# Patient Record
Sex: FEMALE | Race: WHITE | URBAN - METROPOLITAN AREA
[De-identification: names, ages, dates, MRNs, and addresses within clinical notes are randomized per-mention and may not be internally consistent; named-entity substitution may affect disease eponyms.]

---

## 2017-08-30 ENCOUNTER — EMERGENCY (EMERGENCY)
Facility: HOSPITAL | Age: 62
LOS: 0 days | Discharge: HOME | End: 2017-08-30

## 2017-08-30 DIAGNOSIS — Z88.0 ALLERGY STATUS TO PENICILLIN: ICD-10-CM

## 2017-08-30 DIAGNOSIS — I10 ESSENTIAL (PRIMARY) HYPERTENSION: ICD-10-CM

## 2017-08-30 DIAGNOSIS — R63.0 ANOREXIA: ICD-10-CM

## 2017-08-30 DIAGNOSIS — R10.31 RIGHT LOWER QUADRANT PAIN: ICD-10-CM

## 2017-08-30 DIAGNOSIS — Z98.891 HISTORY OF UTERINE SCAR FROM PREVIOUS SURGERY: ICD-10-CM

## 2017-08-30 DIAGNOSIS — F17.200 NICOTINE DEPENDENCE, UNSPECIFIED, UNCOMPLICATED: ICD-10-CM

## 2017-08-30 DIAGNOSIS — Z90.710 ACQUIRED ABSENCE OF BOTH CERVIX AND UTERUS: ICD-10-CM

## 2017-08-30 DIAGNOSIS — E78.00 PURE HYPERCHOLESTEROLEMIA, UNSPECIFIED: ICD-10-CM

## 2018-10-01 ENCOUNTER — EMERGENCY (EMERGENCY)
Facility: HOSPITAL | Age: 63
LOS: 1 days | Discharge: HOME | End: 2018-10-01

## 2018-10-01 VITALS
OXYGEN SATURATION: 98 % | SYSTOLIC BLOOD PRESSURE: 189 MMHG | RESPIRATION RATE: 20 BRPM | TEMPERATURE: 98 F | DIASTOLIC BLOOD PRESSURE: 82 MMHG | HEART RATE: 93 BPM

## 2018-10-01 DIAGNOSIS — R51 HEADACHE: ICD-10-CM

## 2018-10-01 DIAGNOSIS — Z88.0 ALLERGY STATUS TO PENICILLIN: ICD-10-CM
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
## 2018-10-01 NOTE — ED ADULT TRIAGE NOTE - CHIEF COMPLAINT QUOTE
Pt sent from PMD for evaluation of headache after she had syncopal episode last week and hitting her head

## 2018-10-02 ENCOUNTER — EMERGENCY (EMERGENCY)
Facility: HOSPITAL | Age: 63
LOS: 0 days | Discharge: HOME | End: 2018-10-02
Attending: EMERGENCY MEDICINE | Admitting: EMERGENCY MEDICINE

## 2018-10-02 VITALS — SYSTOLIC BLOOD PRESSURE: 131 MMHG | DIASTOLIC BLOOD PRESSURE: 84 MMHG | HEART RATE: 71 BPM

## 2018-10-02 VITALS
OXYGEN SATURATION: 100 % | RESPIRATION RATE: 20 BRPM | HEART RATE: 94 BPM | SYSTOLIC BLOOD PRESSURE: 187 MMHG | TEMPERATURE: 98 F | DIASTOLIC BLOOD PRESSURE: 91 MMHG

## 2018-10-02 DIAGNOSIS — F17.200 NICOTINE DEPENDENCE, UNSPECIFIED, UNCOMPLICATED: ICD-10-CM

## 2018-10-02 DIAGNOSIS — I10 ESSENTIAL (PRIMARY) HYPERTENSION: ICD-10-CM

## 2018-10-02 DIAGNOSIS — R55 SYNCOPE AND COLLAPSE: ICD-10-CM

## 2018-10-02 DIAGNOSIS — Z88.0 ALLERGY STATUS TO PENICILLIN: ICD-10-CM

## 2018-10-02 DIAGNOSIS — R00.2 PALPITATIONS: ICD-10-CM

## 2018-10-02 DIAGNOSIS — R51 HEADACHE: ICD-10-CM

## 2018-10-02 LAB
ALBUMIN SERPL ELPH-MCNC: 4.9 G/DL — SIGNIFICANT CHANGE UP (ref 3.5–5.2)
ALP SERPL-CCNC: 80 U/L — SIGNIFICANT CHANGE UP (ref 30–115)
ALT FLD-CCNC: 15 U/L — SIGNIFICANT CHANGE UP (ref 0–41)
ANION GAP SERPL CALC-SCNC: 12 MMOL/L — SIGNIFICANT CHANGE UP (ref 7–14)
AST SERPL-CCNC: 15 U/L — SIGNIFICANT CHANGE UP (ref 0–41)
BILIRUB SERPL-MCNC: 0.2 MG/DL — SIGNIFICANT CHANGE UP (ref 0.2–1.2)
BUN SERPL-MCNC: 13 MG/DL — SIGNIFICANT CHANGE UP (ref 10–20)
CALCIUM SERPL-MCNC: 9.3 MG/DL — SIGNIFICANT CHANGE UP (ref 8.5–10.1)
CHLORIDE SERPL-SCNC: 104 MMOL/L — SIGNIFICANT CHANGE UP (ref 98–110)
CO2 SERPL-SCNC: 26 MMOL/L — SIGNIFICANT CHANGE UP (ref 17–32)
CREAT SERPL-MCNC: 0.8 MG/DL — SIGNIFICANT CHANGE UP (ref 0.7–1.5)
GLUCOSE SERPL-MCNC: 117 MG/DL — HIGH (ref 70–99)
HCT VFR BLD CALC: 39.2 % — SIGNIFICANT CHANGE UP (ref 37–47)
HGB BLD-MCNC: 13.2 G/DL — SIGNIFICANT CHANGE UP (ref 12–16)
MCHC RBC-ENTMCNC: 30.2 PG — SIGNIFICANT CHANGE UP (ref 27–31)
MCHC RBC-ENTMCNC: 33.7 G/DL — SIGNIFICANT CHANGE UP (ref 32–37)
MCV RBC AUTO: 89.7 FL — SIGNIFICANT CHANGE UP (ref 81–99)
NRBC # BLD: 0 /100 WBCS — SIGNIFICANT CHANGE UP (ref 0–0)
PLATELET # BLD AUTO: 196 K/UL — SIGNIFICANT CHANGE UP (ref 130–400)
POTASSIUM SERPL-MCNC: 4.6 MMOL/L — SIGNIFICANT CHANGE UP (ref 3.5–5)
POTASSIUM SERPL-SCNC: 4.6 MMOL/L — SIGNIFICANT CHANGE UP (ref 3.5–5)
PROT SERPL-MCNC: 6.9 G/DL — SIGNIFICANT CHANGE UP (ref 6–8)
RBC # BLD: 4.37 M/UL — SIGNIFICANT CHANGE UP (ref 4.2–5.4)
RBC # FLD: 12.9 % — SIGNIFICANT CHANGE UP (ref 11.5–14.5)
SODIUM SERPL-SCNC: 142 MMOL/L — SIGNIFICANT CHANGE UP (ref 135–146)
TROPONIN T SERPL-MCNC: <0.01 NG/ML — SIGNIFICANT CHANGE UP
WBC # BLD: 5.62 K/UL — SIGNIFICANT CHANGE UP (ref 4.8–10.8)
WBC # FLD AUTO: 5.62 K/UL — SIGNIFICANT CHANGE UP (ref 4.8–10.8)

## 2018-10-02 RX ORDER — SODIUM CHLORIDE 9 MG/ML
1000 INJECTION INTRAMUSCULAR; INTRAVENOUS; SUBCUTANEOUS ONCE
Qty: 0 | Refills: 0 | Status: COMPLETED | OUTPATIENT
Start: 2018-10-02 | End: 2018-10-02

## 2018-10-02 RX ORDER — TIZANIDINE 4 MG/1
1 TABLET ORAL
Qty: 28 | Refills: 0
Start: 2018-10-02 | End: 2018-10-08

## 2018-10-02 RX ORDER — METOCLOPRAMIDE HCL 10 MG
10 TABLET ORAL ONCE
Qty: 0 | Refills: 0 | Status: COMPLETED | OUTPATIENT
Start: 2018-10-02 | End: 2018-10-02

## 2018-10-02 RX ORDER — IBUPROFEN 200 MG
1 TABLET ORAL
Qty: 28 | Refills: 0
Start: 2018-10-02 | End: 2018-10-08

## 2018-10-02 RX ORDER — MORPHINE SULFATE 50 MG/1
6 CAPSULE, EXTENDED RELEASE ORAL ONCE
Qty: 0 | Refills: 0 | Status: DISCONTINUED | OUTPATIENT
Start: 2018-10-02 | End: 2018-10-02

## 2018-10-02 RX ORDER — KETOROLAC TROMETHAMINE 30 MG/ML
30 SYRINGE (ML) INJECTION ONCE
Qty: 0 | Refills: 0 | Status: DISCONTINUED | OUTPATIENT
Start: 2018-10-02 | End: 2018-10-02

## 2018-10-02 RX ADMIN — Medication 1 MILLIGRAM(S): at 09:28

## 2018-10-02 RX ADMIN — MORPHINE SULFATE 6 MILLIGRAM(S): 50 CAPSULE, EXTENDED RELEASE ORAL at 11:21

## 2018-10-02 RX ADMIN — Medication 30 MILLIGRAM(S): at 09:30

## 2018-10-02 RX ADMIN — SODIUM CHLORIDE 2000 MILLILITER(S): 9 INJECTION INTRAMUSCULAR; INTRAVENOUS; SUBCUTANEOUS at 08:11

## 2018-10-02 RX ADMIN — Medication 10 MILLIGRAM(S): at 08:11

## 2018-10-02 NOTE — ED PROVIDER NOTE - ATTENDING CONTRIBUTION TO CARE
I personally evaluated the patient. I reviewed the Resident’s or Physician Assistant’s note (as assigned above), and agree with the findings and plan except as documented in my note.    62 yo woman with sudden onset of headache a few days ago followed by syncope.  Headache has been persistent since then. No vomiting or nausea.  No exacerbating or alleviating factors.  No fever or chills.  No chest pain or SOB.  EKG normal.  CT head ok. Labs ok.  On reassessment, still with pain despite metoclopramide, ketorolac and lorazepam.  CTA ordered and morphine given with complete relief.  CTA also negative.  Reassurance and discharge home with neuro and PMD follow up.

## 2018-10-02 NOTE — ED PROVIDER NOTE - PROGRESS NOTE DETAILS
Patient states initial HA "20 out of 10," and now "8 out of 10." Will administer morphine. Also will order CTA head as HA has not improved and patient persistently hypertensive throughout course of week. Placed call to patient's PMD, has not received callback yet. Patient states that her headache has completely relieved.  No pain at this time.  CT head and cTA negative.  Stable for discharge and outpatient follow up with neurology and PMD.

## 2018-10-02 NOTE — ED ADULT NURSE NOTE - OBJECTIVE STATEMENT
Pt complaining of headache and high BP x 1 week. Pt also reports falling and hitting her head about 1 week ago also.

## 2018-10-02 NOTE — ED PROVIDER NOTE - OBJECTIVE STATEMENT
62yo female with PMHx HTN on metoprolol 100mg once daily, active smoker, presents c/o persistent, generalized, non-radiating, throbbing x 1 week, initially precipitated s/p a syncopal episode. Patient states she was outside drinking a frozen gatorade, when she experienced "brain freeze," and then syncopized, falling onto deck. She reports she may have hit the back of her head. She states following fall, she has had a daily constant HA and elevated BP. She also reports facial flushing and palpitations. She was seen yesterday by her PMD Dr. Bueno and was found to have /90 and was given samples of a new BP med to take at home. She was also directed to come to ED for head trauma/syncope workup. She states she came into triage yesterday but then left. 64yo female with PMHx HTN on metoprolol 100mg once daily, active smoker, presents c/o persistent, generalized, non-radiating, throbbing x 1 week, initially precipitated s/p a syncopal episode. Patient states she was outside drinking a frozen gatorade, when she experienced "brain freeze," and then syncopized, falling onto deck. She reports she may have hit the back of her head. She states following fall, she has had a daily constant HA and elevated BP. HA mildly relieved with Motrin or Tylenol but not completely. She also reports facial flushing and palpitations. She was seen yesterday by her PMD Dr. Bueno and was found to have /90 and was given samples of a new BP med to take at home. She was also directed to come to ED for head trauma/syncope workup. She states she came into triage yesterday but then left.

## 2018-10-02 NOTE — ED PROVIDER NOTE - MEDICAL DECISION MAKING DETAILS
Chart finished.  I personally evaluated the patient. I reviewed the Resident’s or Physician Assistant’s note (as assigned above), and agree with the findings and plan except as documented in my note.    64 yo woman with sudden onset of headache a few days ago followed by syncope.  Headache has been persistent since then. No vomiting or nausea.  No exacerbating or alleviating factors.  No fever or chills.  No chest pain or SOB.  EKG normal.  CT head ok. Labs ok.  On reassessment, still with pain despite metoclopramide, ketorolac and lorazepam.  CTA ordered and morphine given with complete relief.  CTA also negative.  Reassurance and discharge home with neuro and PMD follow up.

## 2018-10-02 NOTE — ED PROVIDER NOTE - PHYSICAL EXAMINATION
CONST: Well appearing in NAD, anxious  EYES: PERRL, EOMI, Sclera and conjunctiva clear.   ENT: No nasal discharge.   NECK: Non-tender, no meningeal signs  CARD: Normal S1 S2; Normal rate and rhythm  RESP: Equal BS B/L, No wheezes, rhonchi or rales. No distress  GI: Soft, non-tender, non-distended.  MS: Normal ROM in all extremities. No midline spinal tenderness.  SKIN: Warm, dry, no acute rashes. Good turgor  NEURO: A&Ox3, No focal deficits. Strength 5/5 with no sensory deficits. Steady gait

## 2018-10-02 NOTE — ED PROVIDER NOTE - NS ED ROS FT
CONST: No fever, chills or bodyaches  EYES: No pain, redness, drainage or visual changes.  ENT: No ear pain or discharge, nasal discharge or congestion. No sore throat  CARD: No chest pain, (+) palpitations  RESP: No SOB, cough, hemoptysis. No hx of asthma or COPD  GI: No abdominal pain, N/V/D  MS: No joint pain, back pain or extremity pain/injury  SKIN: No rashes  NEURO: see HPI

## 2018-10-03 LAB
HCV AB S/CO SERPL IA: 0.09 S/CO — SIGNIFICANT CHANGE UP
HCV AB SERPL-IMP: SIGNIFICANT CHANGE UP

## 2019-01-10 ENCOUNTER — EMERGENCY (EMERGENCY)
Facility: HOSPITAL | Age: 64
LOS: 0 days | Discharge: HOME | End: 2019-01-10
Attending: EMERGENCY MEDICINE | Admitting: EMERGENCY MEDICINE

## 2019-01-10 VITALS
TEMPERATURE: 97 F | SYSTOLIC BLOOD PRESSURE: 163 MMHG | OXYGEN SATURATION: 100 % | DIASTOLIC BLOOD PRESSURE: 70 MMHG | RESPIRATION RATE: 22 BRPM | HEART RATE: 90 BPM

## 2019-01-10 DIAGNOSIS — E78.5 HYPERLIPIDEMIA, UNSPECIFIED: ICD-10-CM

## 2019-01-10 DIAGNOSIS — Z88.0 ALLERGY STATUS TO PENICILLIN: ICD-10-CM

## 2019-01-10 DIAGNOSIS — R06.00 DYSPNEA, UNSPECIFIED: ICD-10-CM

## 2019-01-10 DIAGNOSIS — Z79.899 OTHER LONG TERM (CURRENT) DRUG THERAPY: ICD-10-CM

## 2019-01-10 DIAGNOSIS — R05 COUGH: ICD-10-CM

## 2019-01-10 DIAGNOSIS — K21.9 GASTRO-ESOPHAGEAL REFLUX DISEASE WITHOUT ESOPHAGITIS: ICD-10-CM

## 2019-01-10 DIAGNOSIS — R06.02 SHORTNESS OF BREATH: ICD-10-CM

## 2019-01-10 DIAGNOSIS — F17.200 NICOTINE DEPENDENCE, UNSPECIFIED, UNCOMPLICATED: ICD-10-CM

## 2019-01-10 DIAGNOSIS — I10 ESSENTIAL (PRIMARY) HYPERTENSION: ICD-10-CM

## 2019-01-10 LAB
ALBUMIN SERPL ELPH-MCNC: 5.2 G/DL — SIGNIFICANT CHANGE UP (ref 3.5–5.2)
ALP SERPL-CCNC: 105 U/L — SIGNIFICANT CHANGE UP (ref 30–115)
ALT FLD-CCNC: 20 U/L — SIGNIFICANT CHANGE UP (ref 0–41)
ANION GAP SERPL CALC-SCNC: 16 MMOL/L — HIGH (ref 7–14)
AST SERPL-CCNC: 18 U/L — SIGNIFICANT CHANGE UP (ref 0–41)
BASE EXCESS BLDV CALC-SCNC: 0.3 MMOL/L — SIGNIFICANT CHANGE UP (ref -2–2)
BASOPHILS # BLD AUTO: 0.02 K/UL — SIGNIFICANT CHANGE UP (ref 0–0.2)
BASOPHILS NFR BLD AUTO: 0.3 % — SIGNIFICANT CHANGE UP (ref 0–1)
BILIRUB SERPL-MCNC: 0.2 MG/DL — SIGNIFICANT CHANGE UP (ref 0.2–1.2)
BUN SERPL-MCNC: 17 MG/DL — SIGNIFICANT CHANGE UP (ref 10–20)
CA-I SERPL-SCNC: 1.29 MMOL/L — SIGNIFICANT CHANGE UP (ref 1.12–1.3)
CALCIUM SERPL-MCNC: 10.2 MG/DL — HIGH (ref 8.5–10.1)
CHLORIDE SERPL-SCNC: 98 MMOL/L — SIGNIFICANT CHANGE UP (ref 98–110)
CO2 SERPL-SCNC: 23 MMOL/L — SIGNIFICANT CHANGE UP (ref 17–32)
CREAT SERPL-MCNC: 1.2 MG/DL — SIGNIFICANT CHANGE UP (ref 0.7–1.5)
D DIMER BLD IA.RAPID-MCNC: 90 NG/ML DDU — SIGNIFICANT CHANGE UP (ref 0–230)
EOSINOPHIL # BLD AUTO: 0.06 K/UL — SIGNIFICANT CHANGE UP (ref 0–0.7)
EOSINOPHIL NFR BLD AUTO: 1 % — SIGNIFICANT CHANGE UP (ref 0–8)
GAS PNL BLDV: 139 MMOL/L — SIGNIFICANT CHANGE UP (ref 136–145)
GAS PNL BLDV: SIGNIFICANT CHANGE UP
GLUCOSE SERPL-MCNC: 113 MG/DL — HIGH (ref 70–99)
HCO3 BLDV-SCNC: 28 MMOL/L — SIGNIFICANT CHANGE UP (ref 22–29)
HCT VFR BLD CALC: 43.9 % — SIGNIFICANT CHANGE UP (ref 37–47)
HCT VFR BLDA CALC: 47.7 % — HIGH (ref 34–44)
HGB BLD CALC-MCNC: 15.6 G/DL — SIGNIFICANT CHANGE UP (ref 14–18)
HGB BLD-MCNC: 14.7 G/DL — SIGNIFICANT CHANGE UP (ref 12–16)
IMM GRANULOCYTES NFR BLD AUTO: 0.2 % — SIGNIFICANT CHANGE UP (ref 0.1–0.3)
INR BLD: 0.97 RATIO — SIGNIFICANT CHANGE UP (ref 0.65–1.3)
LACTATE BLDV-MCNC: 2.1 MMOL/L — HIGH (ref 0.5–1.6)
LYMPHOCYTES # BLD AUTO: 1.72 K/UL — SIGNIFICANT CHANGE UP (ref 1.2–3.4)
LYMPHOCYTES # BLD AUTO: 29.9 % — SIGNIFICANT CHANGE UP (ref 20.5–51.1)
MCHC RBC-ENTMCNC: 29.7 PG — SIGNIFICANT CHANGE UP (ref 27–31)
MCHC RBC-ENTMCNC: 33.5 G/DL — SIGNIFICANT CHANGE UP (ref 32–37)
MCV RBC AUTO: 88.7 FL — SIGNIFICANT CHANGE UP (ref 81–99)
MONOCYTES # BLD AUTO: 0.33 K/UL — SIGNIFICANT CHANGE UP (ref 0.1–0.6)
MONOCYTES NFR BLD AUTO: 5.7 % — SIGNIFICANT CHANGE UP (ref 1.7–9.3)
NEUTROPHILS # BLD AUTO: 3.61 K/UL — SIGNIFICANT CHANGE UP (ref 1.4–6.5)
NEUTROPHILS NFR BLD AUTO: 62.9 % — SIGNIFICANT CHANGE UP (ref 42.2–75.2)
NRBC # BLD: 0 /100 WBCS — SIGNIFICANT CHANGE UP (ref 0–0)
PCO2 BLDV: 55 MMHG — HIGH (ref 41–51)
PH BLDV: 7.31 — SIGNIFICANT CHANGE UP (ref 7.26–7.43)
PLATELET # BLD AUTO: 245 K/UL — SIGNIFICANT CHANGE UP (ref 130–400)
PO2 BLDV: 19 MMHG — LOW (ref 20–40)
POTASSIUM BLDV-SCNC: 5.4 MMOL/L — SIGNIFICANT CHANGE UP (ref 3.3–5.6)
POTASSIUM SERPL-MCNC: 5.7 MMOL/L — HIGH (ref 3.5–5)
POTASSIUM SERPL-SCNC: 5.7 MMOL/L — HIGH (ref 3.5–5)
PROT SERPL-MCNC: 7.8 G/DL — SIGNIFICANT CHANGE UP (ref 6–8)
PROTHROM AB SERPL-ACNC: 11.2 SEC — SIGNIFICANT CHANGE UP (ref 9.95–12.87)
RBC # BLD: 4.95 M/UL — SIGNIFICANT CHANGE UP (ref 4.2–5.4)
RBC # FLD: 13 % — SIGNIFICANT CHANGE UP (ref 11.5–14.5)
SAO2 % BLDV: 24 % — SIGNIFICANT CHANGE UP
SODIUM SERPL-SCNC: 137 MMOL/L — SIGNIFICANT CHANGE UP (ref 135–146)
TROPONIN T SERPL-MCNC: <0.01 NG/ML — SIGNIFICANT CHANGE UP
WBC # BLD: 5.75 K/UL — SIGNIFICANT CHANGE UP (ref 4.8–10.8)
WBC # FLD AUTO: 5.75 K/UL — SIGNIFICANT CHANGE UP (ref 4.8–10.8)

## 2019-01-10 NOTE — ED ADULT NURSE NOTE - OBJECTIVE STATEMENT
pt came to the ER today with c/o chest pressure. pt came to the ER today with c/o difficulty breathing for one month on and off.

## 2019-01-10 NOTE — ED PROVIDER NOTE - CARE PROVIDER_API CALL
Kenny Franco), Critical Care Medicine; Pulmonary Disease; Sleep Medicine  [address] [place] [address]  Phone: [phone]  Fax: [phone]

## 2019-01-10 NOTE — ED PROVIDER NOTE - NS ED ROS FT
General: No fevers,  Eyes:  No visual changes, eye pain or discharge.  ENMT:  No hearing changes, pain,   Cardiac:  +SOB, no edema.  Respiratory:  +cough, no respiratory distress.   GI:  No vomiting, no abdominal pain.  :  No dysuria  MS:  No back pain.  Neuro:  No LOC.  Skin: No skin rash.   Endocrine: No history of thyroid disease or diabetes.

## 2019-01-10 NOTE — ED PROVIDER NOTE - NSFOLLOWUPINSTRUCTIONS_ED_ALL_ED_FT
Follow up with your primary care doctor, cardiology and pulmonology  in 1-3 days     Shortness of breath    Shortness of breath (dyspnea) means you have trouble breathing and could indicate a medical problem. Causes include lung disease, heart disease, low amount of red blood cells (anemia), poor physical fitness, being overweight, smoking, etc. Your health care provider today may not be able to find a cause for your shortness of breath after your exam. In this case, it is important to have a follow-up exam with your primary care physician as instructed. If medicines were prescribed, take them as directed for the full length of time directed. Refrain from tobacco products.    SEEK IMMEDIATE MEDICAL CARE IF YOU HAVE ANY OF THE FOLLOWING SYMPTOMS: worsening shortness of breath, chest pain, back pain, abdominal pain, fever, coughing up blood, lightheadedness/dizziness.

## 2019-01-10 NOTE — ED ADULT NURSE NOTE - NSIMPLEMENTINTERV_GEN_ALL_ED
Implemented All Universal Safety Interventions:  [place] to call system. Call bell, personal items and telephone within reach. Instruct patient to call for assistance. Room bathroom lighting operational. Non-slip footwear when patient is off stretcher. Physically safe environment: no spills, clutter or unnecessary equipment. Stretcher in lowest position, wheels locked, appropriate side rails in place.

## 2019-01-10 NOTE — ED PROVIDER NOTE - OBJECTIVE STATEMENT
NOT DONE  62yo F pmhx sinus tach (dr willett [facility]), depression, anxiety, gerd, htn, hld, palpitations intermittent for "a while," saw cardiologist who dx pt with inappropriate sinus tach, for which she takes corlaner 7.5mg bid presents today for 3 days of generalized weakness and feeling winded. pt does not endorse chest pain, pt reports palpitations and sob all on exertion. pt feels asymptomatic at rest. however pt reports all symptoms have been intermittent for >1 month. pt reports increased stress at home. reports recent cough and sinusitis for which she has been taking bactrim since 1/3 and 7 day course completed. +smoker. 64yo F pmhx sinus tach (dr willett [place] [facility]), depression, anxiety, gerd, htn, hld, palpitations intermittent for "a while," saw cardiologist who dx pt with inappropriate sinus tach, for which she takes corlaner 7.5mg bid presents today for 3 days of generalized weakness and feeling winded. pt does not endorse chest pain, pt reports palpitations and sob all on exertion. pt feels asymptomatic at rest. however pt reports all symptoms have been intermittent for >1 month. pt reports increased stress at home. reports recent cough and sinusitis for which she has been taking bactrim since 1/3 and 7 day course completed. +smoker.

## 2019-01-10 NOTE — ED PROVIDER NOTE - PROGRESS NOTE DETAILS
Attending Note: 62 y/o F PMHx as noted above presents with SOB for last 2 months. Pt is under evaluation of Cardio at [facility], who started pt on several new medications without any improvement in her SX. Pt is smoker who quit yesterday. SX exacerbated with ambulation, including minimal ambulation, and associated with palpitations. PE: As documented. Plan:  Will checks labs, EKG, CXR, and reevaluate for dispo. results reviewed and dw pt. pt ambulated while on o2 sat, o2 sat remained 99% before, during, after ambulation. need for fu and return precautions discussed, pt and family member expressed understanding. Patient to be discharged from ED. Any available test results were discussed with patient and/or family. Verbal instructions given, including instructions to return to ED immediately for any new, worsening, or concerning symptoms. Patient endorsed understanding. Written discharge instructions additionally given, including follow-up plan.

## 2019-01-10 NOTE — ED PROVIDER NOTE - PHYSICAL EXAMINATION
CONSTITUTIONAL: Well-developed; well-nourished; in no acute distress, speaking in full sentences  SKIN: warm, dry  HEAD: Normocephalic; atraumatic  EYES: PERRL, EOMI, no conjunctival erythema  ENT: No nasal discharge; airway clear, mucous membranes moist  NECK: Supple; non tender, FROM  CARD: +S1, S2 no murmurs, gallops, or rubs. Regular rate and rhythm. radial 2+  RESP: No wheezes, rales or rhonchi. CTABL, no increased wob, speaking in full sentences  ABD: soft ntnd, no rebound, no guarding, no rigidity,  EXT: moves all extremities,  No clubbing, cyanosis or edema. les equal in color, temperature, size, pt 2+ b/l   NEURO: Alert, oriented, grossly unremarkable, no focal deficits, cn ii-xii grossly intact, ue and le  strength and sensation grossly intact  PSYCH: Cooperative, appropriate

## 2019-11-04 ENCOUNTER — EMERGENCY (EMERGENCY)
Facility: HOSPITAL | Age: 64
LOS: 0 days | Discharge: HOME | End: 2019-11-04
Attending: EMERGENCY MEDICINE | Admitting: EMERGENCY MEDICINE
Payer: COMMERCIAL

## 2019-11-04 VITALS
RESPIRATION RATE: 17 BRPM | OXYGEN SATURATION: 100 % | HEART RATE: 80 BPM | DIASTOLIC BLOOD PRESSURE: 66 MMHG | SYSTOLIC BLOOD PRESSURE: 131 MMHG

## 2019-11-04 VITALS
TEMPERATURE: 98 F | DIASTOLIC BLOOD PRESSURE: 86 MMHG | HEART RATE: 82 BPM | OXYGEN SATURATION: 100 % | RESPIRATION RATE: 18 BRPM | SYSTOLIC BLOOD PRESSURE: 181 MMHG

## 2019-11-04 DIAGNOSIS — R10.13 EPIGASTRIC PAIN: ICD-10-CM

## 2019-11-04 DIAGNOSIS — Z88.0 ALLERGY STATUS TO PENICILLIN: ICD-10-CM

## 2019-11-04 DIAGNOSIS — R19.7 DIARRHEA, UNSPECIFIED: ICD-10-CM

## 2019-11-04 DIAGNOSIS — R11.2 NAUSEA WITH VOMITING, UNSPECIFIED: ICD-10-CM

## 2019-11-04 DIAGNOSIS — R10.9 UNSPECIFIED ABDOMINAL PAIN: ICD-10-CM

## 2019-11-04 DIAGNOSIS — R10.30 LOWER ABDOMINAL PAIN, UNSPECIFIED: ICD-10-CM

## 2019-11-04 DIAGNOSIS — R10.11 RIGHT UPPER QUADRANT PAIN: ICD-10-CM

## 2019-11-04 LAB
ALBUMIN SERPL ELPH-MCNC: 4.9 G/DL — SIGNIFICANT CHANGE UP (ref 3.5–5.2)
ALP SERPL-CCNC: 90 U/L — SIGNIFICANT CHANGE UP (ref 30–115)
ALT FLD-CCNC: 14 U/L — SIGNIFICANT CHANGE UP (ref 0–41)
ANION GAP SERPL CALC-SCNC: 15 MMOL/L — HIGH (ref 7–14)
APPEARANCE UR: CLEAR — SIGNIFICANT CHANGE UP
AST SERPL-CCNC: 15 U/L — SIGNIFICANT CHANGE UP (ref 0–41)
BASE EXCESS BLDV CALC-SCNC: 6.9 MMOL/L — HIGH (ref -2–2)
BASOPHILS # BLD AUTO: 0.03 K/UL — SIGNIFICANT CHANGE UP (ref 0–0.2)
BASOPHILS NFR BLD AUTO: 0.4 % — SIGNIFICANT CHANGE UP (ref 0–1)
BILIRUB SERPL-MCNC: 0.3 MG/DL — SIGNIFICANT CHANGE UP (ref 0.2–1.2)
BILIRUB UR-MCNC: NEGATIVE — SIGNIFICANT CHANGE UP
BUN SERPL-MCNC: 10 MG/DL — SIGNIFICANT CHANGE UP (ref 10–20)
CA-I SERPL-SCNC: 1.22 MMOL/L — SIGNIFICANT CHANGE UP (ref 1.12–1.3)
CALCIUM SERPL-MCNC: 9.8 MG/DL — SIGNIFICANT CHANGE UP (ref 8.5–10.1)
CHLORIDE SERPL-SCNC: 102 MMOL/L — SIGNIFICANT CHANGE UP (ref 98–110)
CO2 SERPL-SCNC: 25 MMOL/L — SIGNIFICANT CHANGE UP (ref 17–32)
COLOR SPEC: SIGNIFICANT CHANGE UP
CREAT SERPL-MCNC: 0.9 MG/DL — SIGNIFICANT CHANGE UP (ref 0.7–1.5)
DIFF PNL FLD: NEGATIVE — SIGNIFICANT CHANGE UP
EOSINOPHIL # BLD AUTO: 0.07 K/UL — SIGNIFICANT CHANGE UP (ref 0–0.7)
EOSINOPHIL NFR BLD AUTO: 0.9 % — SIGNIFICANT CHANGE UP (ref 0–8)
GAS PNL BLDV: 145 MMOL/L — SIGNIFICANT CHANGE UP (ref 136–145)
GAS PNL BLDV: SIGNIFICANT CHANGE UP
GLUCOSE SERPL-MCNC: 90 MG/DL — SIGNIFICANT CHANGE UP (ref 70–99)
GLUCOSE UR QL: NEGATIVE — SIGNIFICANT CHANGE UP
HCO3 BLDV-SCNC: 32 MMOL/L — HIGH (ref 22–29)
HCT VFR BLD CALC: 39.4 % — SIGNIFICANT CHANGE UP (ref 37–47)
HCT VFR BLDA CALC: 27.4 % — LOW (ref 34–44)
HGB BLD CALC-MCNC: 8.9 G/DL — LOW (ref 14–18)
HGB BLD-MCNC: 13.3 G/DL — SIGNIFICANT CHANGE UP (ref 12–16)
IMM GRANULOCYTES NFR BLD AUTO: 0.3 % — SIGNIFICANT CHANGE UP (ref 0.1–0.3)
KETONES UR-MCNC: NEGATIVE — SIGNIFICANT CHANGE UP
LACTATE BLDV-MCNC: 1.3 MMOL/L — SIGNIFICANT CHANGE UP (ref 0.5–1.6)
LEUKOCYTE ESTERASE UR-ACNC: NEGATIVE — SIGNIFICANT CHANGE UP
LIDOCAIN IGE QN: 33 U/L — SIGNIFICANT CHANGE UP (ref 7–60)
LYMPHOCYTES # BLD AUTO: 2.52 K/UL — SIGNIFICANT CHANGE UP (ref 1.2–3.4)
LYMPHOCYTES # BLD AUTO: 34.1 % — SIGNIFICANT CHANGE UP (ref 20.5–51.1)
MAGNESIUM SERPL-MCNC: 2.2 MG/DL — SIGNIFICANT CHANGE UP (ref 1.8–2.4)
MCHC RBC-ENTMCNC: 30.3 PG — SIGNIFICANT CHANGE UP (ref 27–31)
MCHC RBC-ENTMCNC: 33.8 G/DL — SIGNIFICANT CHANGE UP (ref 32–37)
MCV RBC AUTO: 89.7 FL — SIGNIFICANT CHANGE UP (ref 81–99)
MONOCYTES # BLD AUTO: 0.43 K/UL — SIGNIFICANT CHANGE UP (ref 0.1–0.6)
MONOCYTES NFR BLD AUTO: 5.8 % — SIGNIFICANT CHANGE UP (ref 1.7–9.3)
NEUTROPHILS # BLD AUTO: 4.33 K/UL — SIGNIFICANT CHANGE UP (ref 1.4–6.5)
NEUTROPHILS NFR BLD AUTO: 58.5 % — SIGNIFICANT CHANGE UP (ref 42.2–75.2)
NITRITE UR-MCNC: NEGATIVE — SIGNIFICANT CHANGE UP
NRBC # BLD: 0 /100 WBCS — SIGNIFICANT CHANGE UP (ref 0–0)
PCO2 BLDV: 49 MMHG — SIGNIFICANT CHANGE UP (ref 41–51)
PH BLDV: 7.43 — SIGNIFICANT CHANGE UP (ref 7.26–7.43)
PH UR: 7 — SIGNIFICANT CHANGE UP (ref 5–8)
PLATELET # BLD AUTO: 200 K/UL — SIGNIFICANT CHANGE UP (ref 130–400)
PO2 BLDV: 15 MMHG — LOW (ref 20–40)
POTASSIUM BLDV-SCNC: 4.5 MMOL/L — SIGNIFICANT CHANGE UP (ref 3.3–5.6)
POTASSIUM SERPL-MCNC: 4.6 MMOL/L — SIGNIFICANT CHANGE UP (ref 3.5–5)
POTASSIUM SERPL-SCNC: 4.6 MMOL/L — SIGNIFICANT CHANGE UP (ref 3.5–5)
PROT SERPL-MCNC: 7.1 G/DL — SIGNIFICANT CHANGE UP (ref 6–8)
PROT UR-MCNC: NEGATIVE — SIGNIFICANT CHANGE UP
RBC # BLD: 4.39 M/UL — SIGNIFICANT CHANGE UP (ref 4.2–5.4)
RBC # FLD: 12.8 % — SIGNIFICANT CHANGE UP (ref 11.5–14.5)
SAO2 % BLDV: 26 % — SIGNIFICANT CHANGE UP
SODIUM SERPL-SCNC: 142 MMOL/L — SIGNIFICANT CHANGE UP (ref 135–146)
SP GR SPEC: 1.01 — SIGNIFICANT CHANGE UP (ref 1.01–1.02)
UROBILINOGEN FLD QL: SIGNIFICANT CHANGE UP
WBC # BLD: 7.4 K/UL — SIGNIFICANT CHANGE UP (ref 4.8–10.8)
WBC # FLD AUTO: 7.4 K/UL — SIGNIFICANT CHANGE UP (ref 4.8–10.8)

## 2019-11-04 PROCEDURE — 93010 ELECTROCARDIOGRAM REPORT: CPT

## 2019-11-04 PROCEDURE — 74177 CT ABD & PELVIS W/CONTRAST: CPT | Mod: 26

## 2019-11-04 PROCEDURE — 99285 EMERGENCY DEPT VISIT HI MDM: CPT

## 2019-11-04 PROCEDURE — 71045 X-RAY EXAM CHEST 1 VIEW: CPT | Mod: 26

## 2019-11-04 RX ORDER — MORPHINE SULFATE 50 MG/1
4 CAPSULE, EXTENDED RELEASE ORAL ONCE
Refills: 0 | Status: DISCONTINUED | OUTPATIENT
Start: 2019-11-04 | End: 2019-11-04

## 2019-11-04 RX ORDER — ONDANSETRON 8 MG/1
4 TABLET, FILM COATED ORAL ONCE
Refills: 0 | Status: COMPLETED | OUTPATIENT
Start: 2019-11-04 | End: 2019-11-04

## 2019-11-04 RX ORDER — FAMOTIDINE 10 MG/ML
20 INJECTION INTRAVENOUS ONCE
Refills: 0 | Status: COMPLETED | OUTPATIENT
Start: 2019-11-04 | End: 2019-11-04

## 2019-11-04 RX ORDER — SODIUM CHLORIDE 9 MG/ML
1000 INJECTION, SOLUTION INTRAVENOUS ONCE
Refills: 0 | Status: COMPLETED | OUTPATIENT
Start: 2019-11-04 | End: 2019-11-04

## 2019-11-04 RX ORDER — OXYCODONE AND ACETAMINOPHEN 5; 325 MG/1; MG/1
1 TABLET ORAL ONCE
Refills: 0 | Status: DISCONTINUED | OUTPATIENT
Start: 2019-11-04 | End: 2019-11-04

## 2019-11-04 RX ADMIN — ONDANSETRON 4 MILLIGRAM(S): 8 TABLET, FILM COATED ORAL at 15:22

## 2019-11-04 RX ADMIN — SODIUM CHLORIDE 1000 MILLILITER(S): 9 INJECTION, SOLUTION INTRAVENOUS at 15:23

## 2019-11-04 RX ADMIN — FAMOTIDINE 20 MILLIGRAM(S): 10 INJECTION INTRAVENOUS at 15:24

## 2019-11-04 RX ADMIN — MORPHINE SULFATE 4 MILLIGRAM(S): 50 CAPSULE, EXTENDED RELEASE ORAL at 15:23

## 2019-11-04 RX ADMIN — OXYCODONE AND ACETAMINOPHEN 1 TABLET(S): 5; 325 TABLET ORAL at 17:46

## 2019-11-04 NOTE — ED PROVIDER NOTE - CARE PROVIDER_API CALL
Denise Rebolledo)  Surgery  [address] [place] [address], [address] [place] [address]  Phone: [phone]  Fax: [phone]  Follow Up Time: 1-3 Days

## 2019-11-04 NOTE — ED PROVIDER NOTE - CLINICAL SUMMARY MEDICAL DECISION MAKING FREE TEXT BOX
Pt presented to ED for abd pain, chronic, with multiple episodes in past. Sx's improved but not resolved completely but family wants to go home and f/up with surgery as outpt. Results reviewed and discussed with pt and printed for patient. Anticipatory guidance given including close outpatient followup. Strict return precautions given. Pt verbalizes understanding of and agrees with plan.

## 2019-11-04 NOTE — ED PROVIDER NOTE - OBJECTIVE STATEMENT
Pt is a 65 y/o female with hx of HTN, DLD, DM, who presents to ED for abd pain for 5 days, epigastric, RUQ, and suprapubic, moderate, constant, worse since onset. + nausea, NBNB vomiting, NB diarrhea. No fever, chest pain, SOB, urinary complaints. Pt has had similar pain in the past many times, has had workups including colon-/endo-scopy, but today's pain is worse.

## 2019-11-04 NOTE — ED PROVIDER NOTE - NS ED ROS FT
Constitutional: No fever, chills.  Eyes: No visual changes.  ENT: No hearing changes. No sore throat.  Neck: No neck pain or stiffness.  Cardiovascular: No chest pain, palpitations, edema.  Pulmonary: No SOB, cough. No hemoptysis.  Abdominal: + abdominal pain, nausea, vomiting, diarrhea.  : No dysuria, frequency.  Neuro: No headache, syncope, dizziness.  MS: No back pain. No calf pain/swelling.  Psych: No suicidal ideations.

## 2019-11-04 NOTE — ED PROVIDER NOTE - PATIENT PORTAL LINK FT
You can access the FollowMyHealth Patient Portal offered by [facility] by registering at the following website: [email or web address]. By joining [employer]’s FollowMyHealth portal, you will also be able to view your health information using other applications (apps) compatible with our system.

## 2019-11-04 NOTE — ED PROVIDER NOTE - PHYSICAL EXAMINATION
Constitutional: Well developed, well nourished. NAD.  Head: Normocephalic, atraumatic.  Eyes: PERRL. EOMI.  ENT: No nasal discharge. Mucous membranes moist.  Neck: Supple. Painless ROM.  Cardiovascular: Normal S1, S2. Regular rate and rhythm. No murmurs, rubs, or gallops.  Pulmonary: Normal respiratory rate and effort. Lungs clear to auscultation bilaterally. No wheezing, rales, or rhonchi.  Abdominal: Soft. Nondistended. + epigastric, RUQ, suprapubic tenderness. No rebound, guarding, rigidity.  Extremities. Pelvis stable. No lower extremity edema, symmetric calves.  Skin: No rashes, cyanosis.  Neuro: AAOx3. No focal neurological deficits.  Psych: Normal mood. Normal affect.

## 2019-11-04 NOTE — ED ADULT NURSE NOTE - NSIMPLEMENTINTERV_GEN_ALL_ED
Implemented All Universal Safety Interventions:  [place] to call system. Call bell, personal items and telephone within reach. Instruct patient to call for assistance. Room bathroom lighting operational. Non-slip footwear when patient is off stretcher. Physically safe environment: no spills, clutter or unnecessary equipment. Stretcher in lowest position, wheels locked, appropriate side rails in place.

## 2020-01-29 ENCOUNTER — EMERGENCY (EMERGENCY)
Facility: HOSPITAL | Age: 65
LOS: 0 days | Discharge: HOME | End: 2020-01-29
Attending: EMERGENCY MEDICINE | Admitting: EMERGENCY MEDICINE
Payer: COMMERCIAL

## 2020-01-29 VITALS
OXYGEN SATURATION: 98 % | HEART RATE: 71 BPM | RESPIRATION RATE: 16 BRPM | TEMPERATURE: 98 F | DIASTOLIC BLOOD PRESSURE: 71 MMHG | SYSTOLIC BLOOD PRESSURE: 151 MMHG

## 2020-01-29 VITALS
TEMPERATURE: 98 F | HEART RATE: 65 BPM | RESPIRATION RATE: 18 BRPM | SYSTOLIC BLOOD PRESSURE: 184 MMHG | DIASTOLIC BLOOD PRESSURE: 113 MMHG | OXYGEN SATURATION: 98 %
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
## 2020-01-29 DIAGNOSIS — S62.660A NONDISPLACED FRACTURE OF DISTAL PHALANX OF RIGHT INDEX FINGER, INITIAL ENCOUNTER FOR CLOSED FRACTURE: ICD-10-CM

## 2020-01-29 DIAGNOSIS — S61.310A LACERATION WITHOUT FOREIGN BODY OF RIGHT INDEX FINGER WITH DAMAGE TO NAIL, INITIAL ENCOUNTER: ICD-10-CM
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
## 2020-01-29 DIAGNOSIS — S62.662A NONDISPLACED FRACTURE OF DISTAL PHALANX OF RIGHT MIDDLE FINGER, INITIAL ENCOUNTER FOR CLOSED FRACTURE: ICD-10-CM

## 2020-01-29 DIAGNOSIS — S61.250A OPEN BITE OF RIGHT INDEX FINGER WITHOUT DAMAGE TO NAIL, INITIAL ENCOUNTER: ICD-10-CM

## 2020-01-29 DIAGNOSIS — Z88.0 ALLERGY STATUS TO PENICILLIN: ICD-10-CM

## 2020-01-29 DIAGNOSIS — S61.312A LACERATION WITHOUT FOREIGN BODY OF RIGHT MIDDLE FINGER WITH DAMAGE TO NAIL, INITIAL ENCOUNTER: ICD-10-CM

## 2020-01-29 DIAGNOSIS — Z87.891 PERSONAL HISTORY OF NICOTINE DEPENDENCE: ICD-10-CM

## 2020-01-29 DIAGNOSIS — Z23 ENCOUNTER FOR IMMUNIZATION: ICD-10-CM
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
## 2020-01-29 LAB
ALBUMIN SERPL ELPH-MCNC: 4.6 G/DL — SIGNIFICANT CHANGE UP (ref 3.5–5.2)
ALP SERPL-CCNC: 75 U/L — SIGNIFICANT CHANGE UP (ref 30–115)
ALT FLD-CCNC: 16 U/L — SIGNIFICANT CHANGE UP (ref 0–41)
ANION GAP SERPL CALC-SCNC: 14 MMOL/L — SIGNIFICANT CHANGE UP (ref 7–14)
APTT BLD: 29.9 SEC — SIGNIFICANT CHANGE UP (ref 27–39.2)
AST SERPL-CCNC: 18 U/L — SIGNIFICANT CHANGE UP (ref 0–41)
BASOPHILS # BLD AUTO: 0.03 K/UL — SIGNIFICANT CHANGE UP (ref 0–0.2)
BASOPHILS NFR BLD AUTO: 0.6 % — SIGNIFICANT CHANGE UP (ref 0–1)
BILIRUB SERPL-MCNC: 0.3 MG/DL — SIGNIFICANT CHANGE UP (ref 0.2–1.2)
BLD GP AB SCN SERPL QL: SIGNIFICANT CHANGE UP
BUN SERPL-MCNC: 11 MG/DL — SIGNIFICANT CHANGE UP (ref 10–20)
CALCIUM SERPL-MCNC: 9 MG/DL — SIGNIFICANT CHANGE UP (ref 8.5–10.1)
CHLORIDE SERPL-SCNC: 108 MMOL/L — SIGNIFICANT CHANGE UP (ref 98–110)
CO2 SERPL-SCNC: 22 MMOL/L — SIGNIFICANT CHANGE UP (ref 17–32)
CREAT SERPL-MCNC: 0.9 MG/DL — SIGNIFICANT CHANGE UP (ref 0.7–1.5)
EOSINOPHIL # BLD AUTO: 0.1 K/UL — SIGNIFICANT CHANGE UP (ref 0–0.7)
EOSINOPHIL NFR BLD AUTO: 1.9 % — SIGNIFICANT CHANGE UP (ref 0–8)
GLUCOSE SERPL-MCNC: 158 MG/DL — HIGH (ref 70–99)
HCT VFR BLD CALC: 39.4 % — SIGNIFICANT CHANGE UP (ref 37–47)
HGB BLD-MCNC: 13.2 G/DL — SIGNIFICANT CHANGE UP (ref 12–16)
IMM GRANULOCYTES NFR BLD AUTO: 0.2 % — SIGNIFICANT CHANGE UP (ref 0.1–0.3)
INR BLD: 0.93 RATIO — SIGNIFICANT CHANGE UP (ref 0.65–1.3)
LYMPHOCYTES # BLD AUTO: 1.32 K/UL — SIGNIFICANT CHANGE UP (ref 1.2–3.4)
LYMPHOCYTES # BLD AUTO: 24.4 % — SIGNIFICANT CHANGE UP (ref 20.5–51.1)
MCHC RBC-ENTMCNC: 30.9 PG — SIGNIFICANT CHANGE UP (ref 27–31)
MCHC RBC-ENTMCNC: 33.5 G/DL — SIGNIFICANT CHANGE UP (ref 32–37)
MCV RBC AUTO: 92.3 FL — SIGNIFICANT CHANGE UP (ref 81–99)
MONOCYTES # BLD AUTO: 0.23 K/UL — SIGNIFICANT CHANGE UP (ref 0.1–0.6)
MONOCYTES NFR BLD AUTO: 4.3 % — SIGNIFICANT CHANGE UP (ref 1.7–9.3)
NEUTROPHILS # BLD AUTO: 3.71 K/UL — SIGNIFICANT CHANGE UP (ref 1.4–6.5)
NEUTROPHILS NFR BLD AUTO: 68.6 % — SIGNIFICANT CHANGE UP (ref 42.2–75.2)
NRBC # BLD: 0 /100 WBCS — SIGNIFICANT CHANGE UP (ref 0–0)
PLATELET # BLD AUTO: 178 K/UL — SIGNIFICANT CHANGE UP (ref 130–400)
POTASSIUM SERPL-MCNC: 4.3 MMOL/L — SIGNIFICANT CHANGE UP (ref 3.5–5)
POTASSIUM SERPL-SCNC: 4.3 MMOL/L — SIGNIFICANT CHANGE UP (ref 3.5–5)
PROT SERPL-MCNC: 6.8 G/DL — SIGNIFICANT CHANGE UP (ref 6–8)
PROTHROM AB SERPL-ACNC: 10.7 SEC — SIGNIFICANT CHANGE UP (ref 9.95–12.87)
RBC # BLD: 4.27 M/UL — SIGNIFICANT CHANGE UP (ref 4.2–5.4)
RBC # FLD: 13 % — SIGNIFICANT CHANGE UP (ref 11.5–14.5)
SODIUM SERPL-SCNC: 144 MMOL/L — SIGNIFICANT CHANGE UP (ref 135–146)
WBC # BLD: 5.4 K/UL — SIGNIFICANT CHANGE UP (ref 4.8–10.8)
WBC # FLD AUTO: 5.4 K/UL — SIGNIFICANT CHANGE UP (ref 4.8–10.8)

## 2020-01-29 PROCEDURE — 26750 TREAT FINGER FRACTURE EACH: CPT | Mod: 54

## 2020-01-29 PROCEDURE — 73130 X-RAY EXAM OF HAND: CPT | Mod: 26,RT

## 2020-01-29 PROCEDURE — 99284 EMERGENCY DEPT VISIT MOD MDM: CPT | Mod: 57

## 2020-01-29 RX ORDER — METRONIDAZOLE 500 MG
500 TABLET ORAL ONCE
Refills: 0 | Status: COMPLETED | OUTPATIENT
Start: 2020-01-29 | End: 2020-01-29

## 2020-01-29 RX ORDER — TETANUS TOXOID, REDUCED DIPHTHERIA TOXOID AND ACELLULAR PERTUSSIS VACCINE, ADSORBED 5; 2.5; 8; 8; 2.5 [IU]/.5ML; [IU]/.5ML; UG/.5ML; UG/.5ML; UG/.5ML
0.5 SUSPENSION INTRAMUSCULAR ONCE
Refills: 0 | Status: COMPLETED | OUTPATIENT
Start: 2020-01-29 | End: 2020-01-29

## 2020-01-29 RX ORDER — MORPHINE SULFATE 50 MG/1
8 CAPSULE, EXTENDED RELEASE ORAL ONCE
Refills: 0 | Status: DISCONTINUED | OUTPATIENT
Start: 2020-01-29 | End: 2020-01-29

## 2020-01-29 RX ORDER — METRONIDAZOLE 500 MG
1 TABLET ORAL
Qty: 21 | Refills: 0
Start: 2020-01-29 | End: 2020-02-04

## 2020-01-29 RX ORDER — MORPHINE SULFATE 50 MG/1
6 CAPSULE, EXTENDED RELEASE ORAL ONCE
Refills: 0 | Status: DISCONTINUED | OUTPATIENT
Start: 2020-01-29 | End: 2020-01-29

## 2020-01-29 RX ADMIN — Medication 100 MILLIGRAM(S): at 08:47

## 2020-01-29 RX ADMIN — Medication 110 MILLIGRAM(S): at 10:04

## 2020-01-29 RX ADMIN — MORPHINE SULFATE 6 MILLIGRAM(S): 50 CAPSULE, EXTENDED RELEASE ORAL at 10:46

## 2020-01-29 RX ADMIN — TETANUS TOXOID, REDUCED DIPHTHERIA TOXOID AND ACELLULAR PERTUSSIS VACCINE, ADSORBED 0.5 MILLILITER(S): 5; 2.5; 8; 8; 2.5 SUSPENSION INTRAMUSCULAR at 08:53

## 2020-01-29 RX ADMIN — MORPHINE SULFATE 8 MILLIGRAM(S): 50 CAPSULE, EXTENDED RELEASE ORAL at 08:48

## 2020-01-29 NOTE — ED ADULT NURSE NOTE - OBJECTIVE STATEMENT
pt presents s/p bite from pitbull to right hand 2nd and 3rd digit around 530 am this morning. pt pt with pmh HTN presents s/p bite from vaccinated pitbull to right hand 2nd and 3rd digit around 530 am this morning while trying to break up a dog fight. denies numbness/tingling. denies other injury. bleeding controlled. pulses present. denies numbness/tinglign

## 2020-01-29 NOTE — ED PROVIDER NOTE - NS ED ROS FT
Constitutional: See HPI.  Eyes: No visual changes, eye pain or discharge  ENMT: No hearing changes, pain, discharge or infections.   Cardiac: No SOB or edema. No chest pain with exertion.  Respiratory: No cough or respiratory distress.  GI: No nausea, vomiting, diarrhea or abdominal pain.  : No dysuria, frequency or burning. No Discharge  MS: + finger pain.  No myalgia, muscle weakness,  or back pain.  Neuro: No headache or weakness.   Skin: + lacerations of hand.   Except as documented in the HPI, all other systems are negative.

## 2020-01-29 NOTE — ED PROVIDER NOTE - OBJECTIVE STATEMENT
64 y.o female, hx of HTN, GERD, HLD, anxiety, presents to the ED s/p dog bite today.  Trying to get object out of her dogs mouth and sustained L lacerations R 2/3rd digit. Acute pain, sharp, worse w/ ROM, no alleviating factors, moderate severity, no radiation of pain. Dog UTD on vaccines. right hand dominant. Denies paresthesias, wrist pain.  Not utd on tdap.

## 2020-01-29 NOTE — ED PROVIDER NOTE - CLINICAL SUMMARY MEDICAL DECISION MAKING FREE TEXT BOX
Patient presented s/p dog bite to her right hand with (+) deformity. Obtained xrays which showed (+) fx of second and third fingers with (+) complex overlying lacerations. Spoke with hand who saw patient in ED - they irrigated and repaired lacerations. Recommended splinting and outpatient follow up. Patient splinted in ED after repair without difficulty. Otherwise ambulatory, tolerates PO. Will discharge home with outpatient follow up. Patient agreeable with plan. Agrees to return to ED for any new or worsening symptoms.

## 2020-01-29 NOTE — CONSULT NOTE ADULT - ASSESSMENT
---------------------------------------------------------------------------------------    ASSESSMENT:  64y Female with PMH significant for HTN, GERD, HLD, anxiety, presents to the ED s/p dog bite, lacerations to distal right 2nd and 3rd digits    PLAN:   ·	Xray hand shows distal fracture to 2nd and 3rd digits  ·	Bedside washout, repair of laceration to 2nd and 3rd digits with 5-0 prolene  ·	Patient to be discharged home with clindamycin and flagyl (PCN allergy)  ·	Will follow up with Dr. Mcqueen during office hours on Friday, Jan 31    --------------------------------------------------------------------------------------

## 2020-01-29 NOTE — CONSULT NOTE ADULT - SUBJECTIVE AND OBJECTIVE BOX
Consultation Note  =====================================================    HPI: 64y Female with PMH significant for HTN, GERD, HLD, anxiety, presents to the ED s/p dog bite, sustained injury to distal right 2nd and 3rd digits. Patient complains of excruciating pain. Dog belongs to family, up to date vaccines. Patient was trying to break up a fight between two dogs, when she reached her right hand near mouth of her family dog and was bit. Patient is right hand dominant. She is able to move all digits in her hand but states that she has excruciating pain, unable to flex digits in her right hand.     PAST MEDICAL & SURGICAL HISTORY:  Hypertension    Home Meds: Home Medications:  ALPRAZolam 0.5 mg oral tablet, disintegratin tab(s) orally 3 times a day (10 Martin 2019 11:46)  Corlanor 5 mg oral tablet: 1 tab(s) orally 2 times a day (with meals) (10 Martin 2019 11:46)  Metoprolol Succinate  mg oral tablet, extended release: 1 tab(s) orally once a day (10 Martin 2019 11:46)  olmesartan 40 mg oral tablet: 1 tab(s) orally once a day (10 Matrin 2019 11:46)  omeprazole 40 mg oral delayed release capsule: 1 cap(s) orally once a day (10 Martin 2019 11:46)  rosuvastatin 20 mg oral tablet: 1 tab(s) orally once a day (at bedtime) (10 Martin 2019 11:46)  sulfamethoxazole 500 mg oral tablet: orally 2 times a day for ten days (10 Martin 2019 11:46)  venlafaxine 75 mg oral tablet: 1 tab(s) orally 2 times a day (10 Martin 2019 11:46)    Allergies: Allergies  penicillin (Rash)    ROS:    REVIEW OF SYSTEMS    [X] A ten-point review of systems was otherwise negative except as noted.  [ ] Due to altered mental status/intubation, subjective information were not able to be obtained from the patient. History was obtained, to the extent possible, from review of the chart and collateral sources of information.    --------------------------------------------------------------------------------------  VITAL SIGNS, INS/OUTS (last 24 hours):  --------------------------------------------------------------------------------------  T(C): 36.7 (2020 08:06), Max: 36.7 (2020 08:06)  T(F): 98 (2020 08:06), Max: 98 (2020 08:06)  HR: 65 (2020 08:06) (65 - 65)  BP: 184/113 (2020 08:06) (184/113 - 184/113)  RR: 18 (2020 08:06) (18 - 18)  SpO2: 98% (2020 08:06) (98% - 98%)    --------------------------------------------------------------------------------------  PHYSICAL EXAM  General: NAD, AAOx3, calm and cooperative  HEENT: NCAT, JAIME, EOMI, Trachea ML, Neck supple  Hand: left hand no abnormalities.  Right hand 2nd and 3rd digit lacerations, brisk cap refill. 2+ radial pulses. Distal 2nd and 3rd digit lacerations and exposed bone, nailbed involvement, open fractures. Unable to range DIP joints 2/2 pain. Digits 1, 4, 5 with normal ROM, no obvious injuries.  Cardiac: RRR S1, S2, no Murmurs, rubs or gallops  Respiratory: CTAB, normal respiratory effort, breath sounds equal BL, no wheeze, rhonchi or crackles  Abdomen: Soft, non-distended, non-tender, +bowel sounds  Neuro: Sensation grossly intact and equal throughout, CN II-XII intact, no focal deficits      LABS  --------------------------------------------------------------------------------------  Labs:  CAPILLARY BLOOD GLUCOSE                              13.2   5.40  )-----------( 178      ( 2020 08:30 )             39.4       Auto Neutrophil %: 68.6 % (20 @ 08:30)  Auto Immature Granulocyte %: 0.2 % (20 @ 08:30)        144  |  108  |  11  ----------------------------<  158<H>  4.3   |  22  |  0.9      Calcium, Total Serum: 9.0 mg/dL (20 @ 08:30)      LFTs:             6.8  | 0.3  | 18       ------------------[75      ( 2020 08:30 )  4.6  | x    | 16          Coags:     10.70  ----< 0.93    ( 2020 08:30 )     29.9

## 2020-01-29 NOTE — ED PROVIDER NOTE - NSFOLLOWUPINSTRUCTIONS_ED_ALL_ED_FT
Laceration    A laceration is a cut that goes through all of the layers of the skin and into the tissue that is right under the skin. Some lacerations heal on their own. Others need to be closed with skin adhesive strips, skin glue, stitches (sutures), or staples. Proper laceration care minimizes the risk of infection and helps the laceration to heal better.  If non-absorbable stitches or staples have been placed, they must be taken out within the time frame instructed by your healthcare provider.    SEEK IMMEDIATE MEDICAL CARE IF YOU HAVE ANY OF THE FOLLOWING SYMPTOMS: swelling around the wound, worsening pain, drainage from the wound, red streaking going away from your wound, inability to move finger or toe near the laceration, or discoloration of skin near the laceration.    Fracture    A fracture is a break in one of your bones. This can occur from a variety of injuries, especially traumatic ones. Symptoms include pain, bruising, or swelling. Do not use the injured limb. If a fracture is in one of the bones below your waist, do not put weight on that limb unless instructed to do so by your healthcare provider. Crutches or a cane may have been provided. A splint or cast may have been applied by your health care provider. Make sure to keep it dry and follow up with an orthopedist as instructed.    SEEK IMMEDIATE MEDICAL CARE IF YOU HAVE ANY OF THE FOLLOWING SYMPTOMS: numbness, tingling, increasing pain, or weakness in any part of the injured limb.    Follow up with your primary medical doctor in 1-2 days    Follow up with hand surgery on Friday.

## 2020-01-29 NOTE — ED PROVIDER NOTE - PATIENT PORTAL LINK FT
You can access the FollowMyHealth Patient Portal offered by [facility] by registering at the following website: [email or web address]. By joining [employer]’s FollowMyHealth portal, you will also be able to view your health information using other applications (apps) compatible with our system.

## 2020-01-29 NOTE — ED PROVIDER NOTE - PROGRESS NOTE DETAILS
s/o to Dr. Angulo. Pending hand eval. Fractures on xray noted surgery repaired wound, f/u with [facility] on Friday.  abx ordered. Discussed results with pt.  All questions were answered and return precautions discussed.  Pt is asx and comfortable at this time.  Unremarkable re-exam.  No further concerns at this time from pt.  Will follow up with PMD.  Pt understands and agrees with tx plan. Discussed results with pt.  All questions were answered and return precautions discussed.  Pt is asx and comfortable at this time.  Unremarkable re-exam.  No further concerns at this time from pt.  Will follow up with PMD and surgery.  Pt understands and agrees with tx plan.

## 2020-01-29 NOTE — ED PROVIDER NOTE - CARE PLAN
Principal Discharge DX:	Laceration of finger  Secondary Diagnosis:	Animal bite  Secondary Diagnosis:	Fracture of finger  Secondary Diagnosis:	Need for tetanus booster

## 2020-01-29 NOTE — ED ADULT TRIAGE NOTE - CHIEF COMPLAINT QUOTE
pt BIBA with her vaccinated dog bite to R hand second and third finger, bleeding controlled. dressing removed and evaluated by Toño BEAL.
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
## 2020-01-29 NOTE — ED PROVIDER NOTE - CARE PROVIDER_API CALL
Chase Mcqueen)  Plastic Surgery; Surgery of the Hand  [address] [place] [address], [address]  [place] [address]  Phone: [phone]  Fax: [phone]  Follow Up Time: 1-3 Days

## 2020-01-29 NOTE — ED PROVIDER NOTE - PHYSICAL EXAMINATION
CONST: Well appearing in NAD  EYES: Sclera and conjunctiva clear.  CARD: Normal S1 S2; Normal rate and rhythm  RESP: Equal BS B/L, No wheezes, rhonchi or rales. No distress  GI: Soft, non-tender, non-distended.  MS: Normal ROM in all extremities, open fx 3rd digit w/ 3 cm laceration involving nail bed dorsal aspect of finger extending to palmar aspect, 2 cm laceration dorsal aspect 2nd digit.  , mild active bleeding, FROM of hand without difficulty, distal sensation intact, cap refill < 2 seconds No edema of lower extremities, no calf pain, radial pulses 2+ bilaterally  SKIN: see MS exam  NEURO: A&Ox3, No focal deficits. Strength 5/5 with no sensory deficits. Steady gait

## 2020-01-30 ENCOUNTER — APPOINTMENT (OUTPATIENT)
Dept: PLASTIC SURGERY | Facility: CLINIC | Age: 65
End: 2020-01-30
Payer: COMMERCIAL

## 2020-01-30 VITALS — WEIGHT: 138 LBS | HEIGHT: 64 IN | BODY MASS INDEX: 23.56 KG/M2

## 2020-01-30 DIAGNOSIS — I10 ESSENTIAL (PRIMARY) HYPERTENSION: ICD-10-CM

## 2020-01-30 DIAGNOSIS — F17.200 NICOTINE DEPENDENCE, UNSPECIFIED, UNCOMPLICATED: ICD-10-CM

## 2020-01-30 DIAGNOSIS — E78.00 PURE HYPERCHOLESTEROLEMIA, UNSPECIFIED: ICD-10-CM

## 2020-01-30 PROBLEM — Z00.00 ENCOUNTER FOR PREVENTIVE HEALTH EXAMINATION: Status: ACTIVE | Noted: 2020-01-30

## 2020-01-30 PROCEDURE — 99203 OFFICE O/P NEW LOW 30 MIN: CPT

## 2020-01-30 RX ORDER — METOPROLOL SUCCINATE 100 MG/1
100 TABLET, EXTENDED RELEASE ORAL
Refills: 0 | Status: ACTIVE | COMMUNITY

## 2020-01-30 RX ORDER — VENLAFAXINE HYDROCHLORIDE 75 MG/1
75 CAPSULE, EXTENDED RELEASE ORAL
Refills: 0 | Status: ACTIVE | COMMUNITY

## 2020-01-30 RX ORDER — METRONIDAZOLE 375 MG/1
375 CAPSULE ORAL
Refills: 0 | Status: ACTIVE | COMMUNITY

## 2020-01-30 RX ORDER — DOXYCYCLINE HYCLATE 100 MG/1
100 CAPSULE ORAL
Refills: 0 | Status: ACTIVE | COMMUNITY

## 2020-01-30 RX ORDER — TRAMADOL HYDROCHLORIDE 50 MG/1
50 TABLET, COATED ORAL
Qty: 10 | Refills: 0 | Status: ACTIVE | COMMUNITY
Start: 2020-01-30 | End: 1900-01-01

## 2020-01-30 RX ORDER — ALPRAZOLAM 0.5 MG/1
0.5 TABLET ORAL
Refills: 0 | Status: ACTIVE | COMMUNITY

## 2020-01-30 RX ORDER — ROSUVASTATIN CALCIUM 10 MG/1
10 TABLET, FILM COATED ORAL
Refills: 0 | Status: ACTIVE | COMMUNITY

## 2020-01-30 NOTE — PHYSICAL EXAM
[NI] : Normal [de-identified] : right hand    distal phalynx lacerations sutured, involving nail bed in ER yesterday by surg residents / tissues well approximated
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
## 2020-01-30 NOTE — ASSESSMENT
[FreeTextEntry1] : , five dogs\par pt's daughter her eight children living with her as well\par \par dog bit to right 2+3 fingers

## 2020-01-30 NOTE — DATA REVIEWED
[FreeTextEntry1] : right 2+3rd finger distal phalynx fxs seen on xray\par \par I refviewed case w residents while pt was in hospital and I was in [place]\par Discussed management\par \par Plan:  dressing and splints applied today--leave on until she returns this Monday\par At this point then will do daily dressing changes\par \par Discussed wound healing risks and risks of OM--risk factors include mechanism and 1ppd smoking history\par \par Seen/examined w pt's \par Area anesthetized w 1% lido prior to changing dressing\par \par All ?s asnwered\par \par They are aware of possible future surgery, need for hospitilization or other unplanned emergenet txs\par

## 2020-02-03 ENCOUNTER — APPOINTMENT (OUTPATIENT)
Dept: PLASTIC SURGERY | Facility: CLINIC | Age: 65
End: 2020-02-03
Payer: COMMERCIAL

## 2020-02-03 PROCEDURE — 99212 OFFICE O/P EST SF 10 MIN: CPT
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
## 2020-02-03 NOTE — ASSESSMENT
[FreeTextEntry1] : 65 y/o F with right 2nd and 3rd digit open distal phalanx fracture 1/20/20, s/p repair in ED\par -Bandages changed, splints reapplied\par - taught how to do daily dressing changes\par -Hand rest/elevation\par -Continue PO abx to completion\par -Smoking cessation\par -Tylenol PRN\par -F/u Friday as scheduled

## 2020-02-03 NOTE — PHYSICAL EXAM
[NI] : Normal [de-identified] : right hand with dorsal distal phalanx lacerations, well-approximated, sutures c/d/i, index nail plate sutured into place, +swelling, tenderness, and diminished ROM as expected, normal sensory exam, <2 sec cap refill

## 2020-02-03 NOTE — DATA REVIEWED
[FreeTextEntry1] :  EXAM:  XR HAND MIN 3 VIEWS RT      \par \par \par PROCEDURE DATE:  01/29/2020  \par \par \par \par \par INTERPRETATION:  Clinical History / Reason for exam: Trauma.\par \par Comparison: None.\par \par Procedure: Three views of the right hand.\par \par Findings/\par impression:\par \par Acute complex fractures distal second and third mary kate. Associated soft tissue injury.\par \par \par \par \par \par \par \par \par ELLIOT LANDAU M.D., ATTENDING RADIOLOGIST\par This document has been electronically signed. Jan 29 2020  2:14PM\par   \par \par   \par \par \par [id number]^RI^DC\par They are aware of possible future surgery, need for hospitilization or other unplanned emergenet txs\par

## 2020-02-07 ENCOUNTER — APPOINTMENT (OUTPATIENT)
Dept: PLASTIC SURGERY | Facility: CLINIC | Age: 65
End: 2020-02-07
Payer: COMMERCIAL

## 2020-02-07 PROCEDURE — 99212 OFFICE O/P EST SF 10 MIN: CPT

## 2020-02-07 RX ORDER — BISMUTH TRIBROMOPH/PETROLATUM 5"X9"
BANDAGE TOPICAL AS DIRECTED
Qty: 1 | Refills: 0 | Status: ACTIVE | COMMUNITY
Start: 2020-02-07 | End: 1900-01-01

## 2020-02-07 RX ORDER — LEVOFLOXACIN 750 MG/1
750 TABLET, FILM COATED ORAL DAILY
Qty: 14 | Refills: 0 | Status: ACTIVE | COMMUNITY
Start: 2020-02-07 | End: 1900-01-01
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
## 2020-02-07 NOTE — ASSESSMENT
[FreeTextEntry1] : 65 y/o F with right 2nd and 3rd digit open distal phalanx fracture 1/20/20, s/p repair in ED\par -Bandages changed, splints reapplied\par -Continue daily dressing changes: bacitracin/xeroform\par -Hand rest/elevation\par -Levaquin 750mg q24h x14d sent to pharmacy\par -OT rx for thermoplastic splint\par -Smoking cessation\par -Tylenol PRN\par -F/u 2 weeks; repeat XR prior\par \par Pt seen and examined with Dr. Mcqueen

## 2020-02-07 NOTE — PHYSICAL EXAM
[NI] : Normal [de-identified] : right hand with dorsal distal phalanx lacerations, well-approximated, sutures c/d/i, index nail plate sutured into place, +swelling, tenderness, and diminished ROM as expected, normal sensory exam, <2 sec cap refill

## 2020-02-07 NOTE — DATA REVIEWED
[FreeTextEntry1] :  EXAM:  XR HAND MIN 3 VIEWS RT      \par \par \par PROCEDURE DATE:  01/29/2020  \par \par \par \par \par INTERPRETATION:  Clinical History / Reason for exam: Trauma.\par \par Comparison: None.\par \par Procedure: Three views of the right hand.\par \par Findings/\par impression:\par \par Acute complex fractures distal second and third mary kate. Associated soft tissue injury.\par \par \par \par \par \par \par \par \par ELLIOT LANDAU M.D., ATTENDING RADIOLOGIST\par This document has been electronically signed. Jan 29 2020  2:14PM\par   \par \par   \par \par \par [id number]^RI^DC\par They are aware of possible future surgery, need for hospitilization or other unplanned emergenet txs\par

## 2020-02-13 NOTE — ED ADULT NURSE NOTE - NS ED NURSE DC INFO COMPLEXITY
--------------  DISCHARGE REVIEW    Payor: Karen BERMAN/DENICE  Subscriber #:  [id number]  Authorization Number: [id number]    Admit date: 2/1/20  Admit time:  2113  Discharge Date: 2/4/2020  5:56 PM     Admitting Physician: Chuck Márquez MD    Primary Car Simple: Patient demonstrates quick and easy understanding

## 2020-02-14 RX ORDER — FLUCONAZOLE 150 MG/1
150 TABLET ORAL
Qty: 1 | Refills: 0 | Status: ACTIVE | COMMUNITY
Start: 2020-02-14 | End: 1900-01-01

## 2020-02-14 RX ORDER — CIPROFLOXACIN HYDROCHLORIDE 500 MG/1
500 TABLET, FILM COATED ORAL
Qty: 14 | Refills: 0 | Status: ACTIVE | COMMUNITY
Start: 2020-02-14 | End: 1900-01-01

## 2020-02-19 ENCOUNTER — FORM ENCOUNTER (OUTPATIENT)
Age: 65
End: 2020-02-19

## 2020-02-20 ENCOUNTER — OUTPATIENT (OUTPATIENT)
Dept: OUTPATIENT SERVICES | Facility: HOSPITAL | Age: 65
LOS: 1 days | Discharge: HOME | End: 2020-02-20
Payer: COMMERCIAL

## 2020-02-20 DIAGNOSIS — S62.639D DISPLACED FRACTURE OF DISTAL PHALANX OF UNSPECIFIED FINGER, SUBSEQUENT ENCOUNTER FOR FRACTURE WITH ROUTINE HEALING: ICD-10-CM

## 2020-02-20 PROCEDURE — 73130 X-RAY EXAM OF HAND: CPT | Mod: 26,RT

## 2020-02-25 ENCOUNTER — APPOINTMENT (OUTPATIENT)
Dept: PLASTIC SURGERY | Facility: CLINIC | Age: 65
End: 2020-02-25
Payer: COMMERCIAL

## 2020-02-25 PROCEDURE — 99212 OFFICE O/P EST SF 10 MIN: CPT
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
## 2020-02-25 NOTE — ASSESSMENT
[FreeTextEntry1] : xrays reviewed--fx right 2+3rd distal phalanges in stable position\par \par had reaction to levaquin--breathing difficulty--switched to Bactrim by [facility] one week ago\par pt stopped taking all abx two days ago--making her tired\par \par in sum she had oral abx for one month\par \par we will stop the abx (for now)\par \par she did not get the blood work I had ordered\par \par suggest f/u in 2 wks for suture removal\par \par repeat xray in one month\par \par recently had the flu

## 2020-02-25 NOTE — PHYSICAL EXAM
[de-identified] : diminished energy, otherwise fine.  no fevers [de-identified] : right 2+3 finger tips w resolving swelling, no cellulitis, no drainage\par stable scatted eschar

## 2020-02-25 NOTE — DATA REVIEWED
[FreeTextEntry1] : EXAM: XR HAND MIN 3 VIEWS RT \par \par \par PROCEDURE DATE: 02/20/2020 \par \par \par \par \par INTERPRETATION: CLINICAL HISTORY / REASON FOR EXAM: Follow-up fractures. \par \par TECHNIQUE: 3 views of the right hand with AP, oblique, and lateral \par projections. \par \par COMPARISON: Radiographs of the right hand dated 1/29/2020. \par \par FINDINGS: \par \par Splinting of the second and third digits. Comminuted fracture of the third \par distal phalangeal tuft with improved alignment. No bridging bone to suggest \par healing. Transverse fracture of the second distal phalangeal tuft, \par unchanged. Fracture lines remain visible. \par \par No new acute fractures. Subchondral cysts/erosions of the scaphoid, lunate, \par and third proximal phalangeal head. \par \par \par IMPRESSION: \par \par 1. Improved alignment in the third distal phalangeal tuft fracture. \par 2. Unchanged second distal phalangeal tuft fracture. \par 3. Subchondral cysts/erosions in the scaphoid, lunate, and third proximal \par phalangeal head may be due to an underlying inflammatory arthropathy (such \par as rheumatoid arthritis). \par \par \par \par \par VICKEY SOLIS M.D., RESIDENT RADIOLOGIST \par This document has been electronically signed. \par SHER FITZGERALD M.D., ATTENDING RADIOLOGIST \par This document has been electronically signed. Feb 21 2020 7:36AM \par \par \par

## 2020-02-28 ENCOUNTER — EMERGENCY (EMERGENCY)
Facility: HOSPITAL | Age: 65
LOS: 0 days | Discharge: HOME | End: 2020-02-28
Attending: EMERGENCY MEDICINE | Admitting: EMERGENCY MEDICINE
Payer: COMMERCIAL

## 2020-02-28 VITALS
RESPIRATION RATE: 16 BRPM | OXYGEN SATURATION: 99 % | TEMPERATURE: 98 F | DIASTOLIC BLOOD PRESSURE: 85 MMHG | HEART RATE: 82 BPM | SYSTOLIC BLOOD PRESSURE: 149 MMHG

## 2020-02-28 DIAGNOSIS — R05 COUGH: ICD-10-CM

## 2020-02-28 DIAGNOSIS — R10.84 GENERALIZED ABDOMINAL PAIN: ICD-10-CM

## 2020-02-28 DIAGNOSIS — R07.9 CHEST PAIN, UNSPECIFIED: ICD-10-CM

## 2020-02-28 DIAGNOSIS — R53.1 WEAKNESS: ICD-10-CM

## 2020-02-28 DIAGNOSIS — Z87.891 PERSONAL HISTORY OF NICOTINE DEPENDENCE: ICD-10-CM

## 2020-02-28 DIAGNOSIS — R53.81 OTHER MALAISE: ICD-10-CM

## 2020-02-28 DIAGNOSIS — R53.83 OTHER FATIGUE: ICD-10-CM

## 2020-02-28 DIAGNOSIS — K21.9 GASTRO-ESOPHAGEAL REFLUX DISEASE WITHOUT ESOPHAGITIS: ICD-10-CM

## 2020-02-28 DIAGNOSIS — E78.5 HYPERLIPIDEMIA, UNSPECIFIED: ICD-10-CM

## 2020-02-28 DIAGNOSIS — I10 ESSENTIAL (PRIMARY) HYPERTENSION: ICD-10-CM

## 2020-02-28 LAB
ALBUMIN SERPL ELPH-MCNC: 4.8 G/DL — SIGNIFICANT CHANGE UP (ref 3.5–5.2)
ALP SERPL-CCNC: 83 U/L — SIGNIFICANT CHANGE UP (ref 30–115)
ALT FLD-CCNC: 29 U/L — SIGNIFICANT CHANGE UP (ref 0–41)
ANION GAP SERPL CALC-SCNC: 11 MMOL/L — SIGNIFICANT CHANGE UP (ref 7–14)
AST SERPL-CCNC: 24 U/L — SIGNIFICANT CHANGE UP (ref 0–41)
BASOPHILS # BLD AUTO: 0.03 K/UL — SIGNIFICANT CHANGE UP (ref 0–0.2)
BASOPHILS NFR BLD AUTO: 0.4 % — SIGNIFICANT CHANGE UP (ref 0–1)
BILIRUB SERPL-MCNC: <0.2 MG/DL — SIGNIFICANT CHANGE UP (ref 0.2–1.2)
BUN SERPL-MCNC: 14 MG/DL — SIGNIFICANT CHANGE UP (ref 10–20)
CALCIUM SERPL-MCNC: 9.4 MG/DL — SIGNIFICANT CHANGE UP (ref 8.5–10.1)
CHLORIDE SERPL-SCNC: 104 MMOL/L — SIGNIFICANT CHANGE UP (ref 98–110)
CO2 SERPL-SCNC: 25 MMOL/L — SIGNIFICANT CHANGE UP (ref 17–32)
CREAT SERPL-MCNC: 1 MG/DL — SIGNIFICANT CHANGE UP (ref 0.7–1.5)
EOSINOPHIL # BLD AUTO: 0.07 K/UL — SIGNIFICANT CHANGE UP (ref 0–0.7)
EOSINOPHIL NFR BLD AUTO: 0.9 % — SIGNIFICANT CHANGE UP (ref 0–8)
GLUCOSE SERPL-MCNC: 97 MG/DL — SIGNIFICANT CHANGE UP (ref 70–99)
HCT VFR BLD CALC: 39.4 % — SIGNIFICANT CHANGE UP (ref 37–47)
HGB BLD-MCNC: 13.9 G/DL — SIGNIFICANT CHANGE UP (ref 12–16)
IMM GRANULOCYTES NFR BLD AUTO: 0.1 % — SIGNIFICANT CHANGE UP (ref 0.1–0.3)
LACTATE SERPL-SCNC: 1.1 MMOL/L — SIGNIFICANT CHANGE UP (ref 0.7–2)
LYMPHOCYTES # BLD AUTO: 2.34 K/UL — SIGNIFICANT CHANGE UP (ref 1.2–3.4)
LYMPHOCYTES # BLD AUTO: 31.3 % — SIGNIFICANT CHANGE UP (ref 20.5–51.1)
MCHC RBC-ENTMCNC: 32 PG — HIGH (ref 27–31)
MCHC RBC-ENTMCNC: 35.3 G/DL — SIGNIFICANT CHANGE UP (ref 32–37)
MCV RBC AUTO: 90.8 FL — SIGNIFICANT CHANGE UP (ref 81–99)
MONOCYTES # BLD AUTO: 0.36 K/UL — SIGNIFICANT CHANGE UP (ref 0.1–0.6)
MONOCYTES NFR BLD AUTO: 4.8 % — SIGNIFICANT CHANGE UP (ref 1.7–9.3)
NEUTROPHILS # BLD AUTO: 4.66 K/UL — SIGNIFICANT CHANGE UP (ref 1.4–6.5)
NEUTROPHILS NFR BLD AUTO: 62.5 % — SIGNIFICANT CHANGE UP (ref 42.2–75.2)
NRBC # BLD: 0 /100 WBCS — SIGNIFICANT CHANGE UP (ref 0–0)
PLATELET # BLD AUTO: 199 K/UL — SIGNIFICANT CHANGE UP (ref 130–400)
POTASSIUM SERPL-MCNC: 4.6 MMOL/L — SIGNIFICANT CHANGE UP (ref 3.5–5)
POTASSIUM SERPL-SCNC: 4.6 MMOL/L — SIGNIFICANT CHANGE UP (ref 3.5–5)
PROT SERPL-MCNC: 7.2 G/DL — SIGNIFICANT CHANGE UP (ref 6–8)
RBC # BLD: 4.34 M/UL — SIGNIFICANT CHANGE UP (ref 4.2–5.4)
RBC # FLD: 12.2 % — SIGNIFICANT CHANGE UP (ref 11.5–14.5)
SODIUM SERPL-SCNC: 140 MMOL/L — SIGNIFICANT CHANGE UP (ref 135–146)
TROPONIN T SERPL-MCNC: <0.01 NG/ML — SIGNIFICANT CHANGE UP
WBC # BLD: 7.47 K/UL — SIGNIFICANT CHANGE UP (ref 4.8–10.8)
WBC # FLD AUTO: 7.47 K/UL — SIGNIFICANT CHANGE UP (ref 4.8–10.8)

## 2020-02-28 PROCEDURE — 93010 ELECTROCARDIOGRAM REPORT: CPT

## 2020-02-28 PROCEDURE — 71045 X-RAY EXAM CHEST 1 VIEW: CPT | Mod: 26

## 2020-02-28 PROCEDURE — 99285 EMERGENCY DEPT VISIT HI MDM: CPT

## 2020-02-28 RX ORDER — HYDROXYZINE HCL 10 MG
25 TABLET ORAL ONCE
Refills: 0 | Status: COMPLETED | OUTPATIENT
Start: 2020-02-28 | End: 2020-02-28

## 2020-02-28 RX ORDER — SODIUM CHLORIDE 9 MG/ML
1000 INJECTION INTRAMUSCULAR; INTRAVENOUS; SUBCUTANEOUS ONCE
Refills: 0 | Status: COMPLETED | OUTPATIENT
Start: 2020-02-28 | End: 2020-02-28

## 2020-02-28 RX ORDER — METOCLOPRAMIDE HCL 10 MG
10 TABLET ORAL ONCE
Refills: 0 | Status: COMPLETED | OUTPATIENT
Start: 2020-02-28 | End: 2020-02-28

## 2020-02-28 RX ORDER — ACETAMINOPHEN 500 MG
975 TABLET ORAL ONCE
Refills: 0 | Status: COMPLETED | OUTPATIENT
Start: 2020-02-28 | End: 2020-02-28

## 2020-02-28 RX ADMIN — SODIUM CHLORIDE 2000 MILLILITER(S): 9 INJECTION INTRAMUSCULAR; INTRAVENOUS; SUBCUTANEOUS at 12:38

## 2020-02-28 RX ADMIN — Medication 10 MILLIGRAM(S): at 14:45

## 2020-02-28 RX ADMIN — Medication 25 MILLIGRAM(S): at 14:45

## 2020-02-28 RX ADMIN — Medication 975 MILLIGRAM(S): at 12:37

## 2020-02-28 NOTE — ED PROVIDER NOTE - ATTENDING CONTRIBUTION TO CARE
65 yo female PMH as noted c/o  malaise for a few weeks, states she and her entire family had flu.  Fever, cough has resolved, she denies any N/V/abdominal pain, urinary complaints, HA, focal weakness or other acute complaints.  Says she stays mostly at home and sleeps a lot.  Reports nml PO intake, her  confirms it.  patient appears well, NAD, exam is nonfocal, rt 2 fingers are dressed and splinted.  patient has a follow up with her hand surgeon on Tuesday for suture removal,  Will give fluids, and check labs, to put patient and  at ease.  I has a long discussion with them regarding the need for proper PO intake and activity.

## 2020-02-28 NOTE — ED PROVIDER NOTE - CARE PROVIDER_API CALL
Antonio H[place]ins)  [place] [address]  Phone: [phone]  Fax: [phone]  Follow Up Time: 4-6 Days

## 2020-02-28 NOTE — ED PROVIDER NOTE - PROGRESS NOTE DETAILS
labs are WNL, Patient to be discharged from ED. Any available test results were discussed with patient and . Verbal instructions given, including instructions to return to ED immediately for any new, worsening, or concerning symptoms. Patient endorsed understanding. Written discharge instructions additionally given, including follow-up plan.  Patient was given opportunity to ask questions.

## 2020-02-28 NOTE — ED PROVIDER NOTE - OBJECTIVE STATEMENT
64y F w/ PMH of HTN, GERD, HLD, and anxiety presents with general malaise for 3 weeks. States had broken hand 2/2 dog bite 4 weeks ago. Has been feeling off since that injury. Starting 3 weeks ago had decreased energy with diffuse body aches. States all parts of her body hurts. Denies fever, chills, n/v/d, dysuria, or numbness/tingling.

## 2020-02-28 NOTE — ED PROVIDER NOTE - NSFOLLOWUPINSTRUCTIONS_ED_ALL_ED_FT
Fatigue    WHAT YOU NEED TO KNOW:    Fatigue is mental and physical exhaustion that does not get better with rest. Fatigue may make daily activities difficult or cause extreme sleepiness. It is normal to feel tired sometimes, but long-term fatigue may be a sign of serious illness.    DISCHARGE INSTRUCTIONS:    Return to the emergency department if:     You have chest pain.       You have difficulty breathing.     Contact your healthcare provider if:     You have a cough that gets worse, or does not go away.       You see blood in your urine or bowel movement.       You have numbness or tingling around your mouth or in an arm or leg.       You faint, feel dizzy, or have vision changes.       You have swelling in your lymph nodes.       You are a woman and have vaginal bleeding that is not normal for you, or is not expected.       You lose weight without trying, or you have trouble eating.       You feel weak or have muscle pain.       You have pain or swelling in your joints.      You have questions or concerns about your condition or care.     Follow up with your healthcare provider as directed: You may need more tests. Your healthcare provider may also refer you to a specialist. Write down your questions so you remember to ask them during your visits.    Manage fatigue:     Keep a fatigue diary. Include anything that makes you feel more tired or less tired. Bring the diary with you to follow-up visits with your provider.      Exercise as directed. Exercise can help you feel more alert. Exercise can also help you manage stress or relieve depression. Try to get at least 30 minutes of exercise most days of the week.      Keep a regular sleep schedule. Go to bed and wake up at the same times every day. Limit naps to 1 hour each day. A nap can improve fatigue, but a long nap may make it harder to go to sleep at night.      Plan and limit your activities. Limit the number of activities such as shopping and cleaning you do each day. If possible, try to spread out your trips throughout the week. Plan ahead so you are not rushing to get something done. Only do activities that you have the energy to complete. Take breaks between activities. Ask for help if you need it. Another person may be able to drive you or help with daily activities.      Eat a variety of healthy foods. Healthy foods include fruits, vegetables, whole-grain breads, low-fat dairy products, beans, lean meats, and fish. Good nutrition can help manage fatigue.      Limit caffeine and alcohol. These can make it difficult to fall or stay asleep. Women should limit alcohol to 1 drink a day. Men should limit alcohol to 2 drinks a day. A drink of alcohol is 12 ounces of beer, 5 ounces of wine, or 1½ ounces of liquor. Ask our healthcare provider how much caffeine is safe for you.      Do not smoke. Nicotine and other chemicals in cigarettes and cigars can cause lung damage and increase fatigue. Ask your healthcare provider for information if you currently smoke and need help to quit. E-cigarettes or smokeless tobacco still contain nicotine. Talk to your healthcare provider before you use these products.

## 2020-02-28 NOTE — ED PROVIDER NOTE - PATIENT PORTAL LINK FT
You can access the FollowMyHealth Patient Portal offered by [facility] by registering at the following website: [email or web address]. By joining [facility]’s FollowMyHealth portal, you will also be able to view your health information using other applications (apps) compatible with our system.

## 2020-02-28 NOTE — ED PROVIDER NOTE - NS ED ROS FT
Eyes:  No visual changes, eye pain or discharge.  ENMT:  No hearing changes, pain, no sore throat or runny nose, no difficulty swallowing  Cardiac:  No chest pain, SOB or edema. No chest pain with exertion. + chest pain  Respiratory:  No cough or respiratory distress. No hemoptysis. No history of asthma or RAD. + cough  GI:  No nausea, vomiting, diarrhea + abdominal pain.  :  No dysuria, frequency or burning.  MS:  No myalgia, muscle weakness, joint pain or back pain. + myalgia  Neuro:  No headache or weakness.  No LOC.  Skin:  No skin rash.   Endocrine: No history of thyroid disease or diabetes.

## 2020-02-28 NOTE — ED PROVIDER NOTE - PHYSICAL EXAMINATION
CONSTITUTIONAL: Anxious appearing.  SKIN: warm, dry  HEAD: Normocephalic; atraumatic.  EYES: PERRL, EOMI, no conjunctival erythema  ENT: No nasal discharge; airway clear.  NECK: Supple; non tender.  CARD: S1, S2 normal; no murmurs, gallops, or rubs. Regular rate and rhythm.   RESP: No wheezes, rales or rhonchi.  ABD: diffuse tenderness to palpation.  EXT: Normal ROM.  No clubbing, cyanosis or edema. Diffuse tenderness to palpation  LYMPH: No acute cervical adenopathy.  NEURO: Alert, oriented, grossly unremarkable  PSYCH: Cooperative, appropriate.

## 2020-03-05 ENCOUNTER — APPOINTMENT (OUTPATIENT)
Dept: PLASTIC SURGERY | Facility: CLINIC | Age: 65
End: 2020-03-05
Payer: COMMERCIAL

## 2020-03-05 PROCEDURE — 99212 OFFICE O/P EST SF 10 MIN: CPT
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
## 2020-03-05 NOTE — PHYSICAL EXAM
[de-identified] : well-appearing, NAD [de-identified] : Right hand 2nd and 3 finger tips with healing incisions, swelling as expected, no cellulitis, no drainage, no fluctuance, appropriate tenderness with hypersensitivity to palpation 3rd>2nd digit, \par stable scattered eschar
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
## 2020-03-17 ENCOUNTER — OUTPATIENT (OUTPATIENT)
Dept: OUTPATIENT SERVICES | Facility: HOSPITAL | Age: 65
LOS: 1 days | Discharge: HOME | End: 2020-03-17
Payer: COMMERCIAL

## 2020-03-17 ENCOUNTER — RESULT REVIEW (OUTPATIENT)
Age: 65
End: 2020-03-17

## 2020-03-17 ENCOUNTER — APPOINTMENT (OUTPATIENT)
Dept: PLASTIC SURGERY | Facility: CLINIC | Age: 65
End: 2020-03-17
Payer: COMMERCIAL

## 2020-03-17 DIAGNOSIS — S62.639D DISPLACED FRACTURE OF DISTAL PHALANX OF UNSPECIFIED FINGER, SUBSEQUENT ENCOUNTER FOR FRACTURE WITH ROUTINE HEALING: ICD-10-CM

## 2020-03-17 PROCEDURE — 99212 OFFICE O/P EST SF 10 MIN: CPT

## 2020-03-17 PROCEDURE — 73130 X-RAY EXAM OF HAND: CPT | Mod: 26,RT

## 2020-03-17 NOTE — PHYSICAL EXAM
[de-identified] : well-appearing, NAD [de-identified] : Right hand 2nd and 3 finger tips with healed incisions, small area of epidermal loss radial aspect of middle digit, swelling as expected, no cellulitis, no drainage, no fluctuance, appropriate tenderness with hypersensitivity to palpation 3rd>2nd digit, \par limited passive and active flexion at IPs limited by pain and swelling
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
## 2020-03-31 ENCOUNTER — APPOINTMENT (OUTPATIENT)
Dept: PLASTIC SURGERY | Facility: CLINIC | Age: 65
End: 2020-03-31

## 2020-06-29 ENCOUNTER — APPOINTMENT (OUTPATIENT)
Dept: PLASTIC SURGERY | Facility: CLINIC | Age: 65
End: 2020-06-29
Payer: COMMERCIAL

## 2020-06-29 DIAGNOSIS — S41.159A OPEN BITE OF UNSPECIFIED UPPER ARM, INITIAL ENCOUNTER: ICD-10-CM

## 2020-06-29 DIAGNOSIS — S62.639D DISPLACED FRACTURE OF DISTAL PHALANX OF UNSPECIFIED FINGER, SUBSEQUENT ENCOUNTER FOR FRACTURE WITH ROUTINE HEALING: ICD-10-CM
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
## 2020-06-29 PROCEDURE — 99212 OFFICE O/P EST SF 10 MIN: CPT
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
## 2020-06-29 NOTE — PHYSICAL EXAM
[de-identified] : well-appearing, NAD [de-identified] : Right hand 2nd and 3 finger tips with healed incisions, mild residual swelling as expected, nail plate irregularity,  diminished sensation fingertips 3rd>2nd digit, \par limited but improved passive and active flexion at PIP/DIPs

## 2020-12-17 NOTE — ED ADULT TRIAGE NOTE - BP NONINVASIVE DIASTOLIC (MM HG)
Relevant Problems   No relevant active problems       Anesthetic History   No history of anesthetic complications            Review of Systems / Medical History  Patient summary reviewed, nursing notes reviewed and pertinent labs reviewed    Pulmonary  Within defined limits                 Neuro/Psych   Within defined limits           Cardiovascular    Hypertension                   GI/Hepatic/Renal     GERD           Endo/Other        Arthritis and cancer     Other Findings   Comments: Multiple Myeoloma           Physical Exam    Airway  Mallampati: I  TM Distance: 4 - 6 cm  Neck ROM: normal range of motion   Mouth opening: Normal     Cardiovascular    Rhythm: regular  Rate: normal         Dental    Dentition: Poor dentition     Pulmonary  Breath sounds clear to auscultation               Abdominal  GI exam deferred       Other Findings            Anesthetic Plan    ASA: 4  Anesthesia type: general and total IV anesthesia          Induction: Intravenous  Anesthetic plan and risks discussed with: Patient 113

## 2021-03-30 ENCOUNTER — EMERGENCY (EMERGENCY)
Facility: HOSPITAL | Age: 66
LOS: 1 days | Discharge: HOME | End: 2021-03-30
Attending: EMERGENCY MEDICINE | Admitting: EMERGENCY MEDICINE
Payer: COMMERCIAL

## 2021-03-30 VITALS
WEIGHT: 139.99 LBS | HEART RATE: 76 BPM | OXYGEN SATURATION: 98 % | TEMPERATURE: 98 F | RESPIRATION RATE: 16 BRPM | HEIGHT: 64 IN | SYSTOLIC BLOOD PRESSURE: 165 MMHG | DIASTOLIC BLOOD PRESSURE: 74 MMHG

## 2021-03-30 DIAGNOSIS — Z88.0 ALLERGY STATUS TO PENICILLIN: ICD-10-CM

## 2021-03-30 DIAGNOSIS — M54.2 CERVICALGIA: ICD-10-CM

## 2021-03-30 DIAGNOSIS — Z79.1 LONG TERM (CURRENT) USE OF NON-STEROIDAL ANTI-INFLAMMATORIES (NSAID): ICD-10-CM

## 2021-03-30 DIAGNOSIS — I10 ESSENTIAL (PRIMARY) HYPERTENSION: ICD-10-CM

## 2021-03-30 DIAGNOSIS — K21.9 GASTRO-ESOPHAGEAL REFLUX DISEASE WITHOUT ESOPHAGITIS: ICD-10-CM

## 2021-03-30 DIAGNOSIS — M54.5 LOW BACK PAIN: ICD-10-CM

## 2021-03-30 DIAGNOSIS — M79.602 PAIN IN LEFT ARM: ICD-10-CM

## 2021-03-30 DIAGNOSIS — F41.8 OTHER SPECIFIED ANXIETY DISORDERS: ICD-10-CM

## 2021-03-30 DIAGNOSIS — G89.29 OTHER CHRONIC PAIN: ICD-10-CM

## 2021-03-30 DIAGNOSIS — E78.5 HYPERLIPIDEMIA, UNSPECIFIED: ICD-10-CM

## 2021-03-30 DIAGNOSIS — Z79.899 OTHER LONG TERM (CURRENT) DRUG THERAPY: ICD-10-CM

## 2021-03-30 DIAGNOSIS — Z79.2 LONG TERM (CURRENT) USE OF ANTIBIOTICS: ICD-10-CM

## 2021-03-30 PROCEDURE — 93010 ELECTROCARDIOGRAM REPORT: CPT

## 2021-03-30 PROCEDURE — 99284 EMERGENCY DEPT VISIT MOD MDM: CPT

## 2021-03-30 RX ORDER — GABAPENTIN 400 MG/1
1 CAPSULE ORAL
Qty: 21 | Refills: 0
Start: 2021-03-30 | End: 2021-04-05

## 2021-03-30 RX ORDER — METHOCARBAMOL 500 MG/1
1500 TABLET, FILM COATED ORAL ONCE
Refills: 0 | Status: COMPLETED | OUTPATIENT
Start: 2021-03-30 | End: 2021-03-30

## 2021-03-30 RX ORDER — METHOCARBAMOL 500 MG/1
2 TABLET, FILM COATED ORAL
Qty: 30 | Refills: 0
Start: 2021-03-30 | End: 2021-04-03

## 2021-03-30 RX ORDER — GABAPENTIN 400 MG/1
300 CAPSULE ORAL ONCE
Refills: 0 | Status: COMPLETED | OUTPATIENT
Start: 2021-03-30 | End: 2021-03-30

## 2021-03-30 RX ORDER — KETOROLAC TROMETHAMINE 30 MG/ML
30 SYRINGE (ML) INJECTION ONCE
Refills: 0 | Status: DISCONTINUED | OUTPATIENT
Start: 2021-03-30 | End: 2021-03-30

## 2021-03-30 RX ADMIN — GABAPENTIN 300 MILLIGRAM(S): 400 CAPSULE ORAL at 10:51

## 2021-03-30 RX ADMIN — Medication 30 MILLIGRAM(S): at 10:51

## 2021-03-30 RX ADMIN — METHOCARBAMOL 1500 MILLIGRAM(S): 500 TABLET, FILM COATED ORAL at 10:50

## 2021-03-30 NOTE — ED PROVIDER NOTE - OBJECTIVE STATEMENT
66 y/o F, PMHx HTN, GERD, HLD, Depression & Anxiety, presents to the ED with complaints of left sided neck and arm pain x three days. Patient states that dog jumped on this side and pain began shortly thereafter. She took Oxycodone 10mg (takes for her chronic lower back pain) and Tylenol with minimal symptomatic improvement. She admits to a sharp pain along her right upper arm ; denies any paresthesias, skin color changes/rash, fever, chills, nausea, vomiting, chest pain, dyspnea and cephalgia.

## 2021-03-30 NOTE — ED PROVIDER NOTE - PROGRESS NOTE DETAILS
Patient feeling improved following medication ; will discharge to home with neurology, PMD and rehab clinic f.u. ED Attending BETH Guerra  Pt feeling better, FROM at this time, awarof ekg, I discussed with patient need for possible MRI for further work up for their symptoms and how this was not possible in the Emergency Department at this time.  Follow up being provided to have further evaluation for this test given.  pt understands symptomatic treatment at this time, signs and symptoms to return for, follow up with pmd, rehab, pain management and neurology, reports will follow up.

## 2021-03-30 NOTE — ED ADULT NURSE NOTE - OBJECTIVE STATEMENT
Patient c/o left arm pain for a couple of days, denies trauma to area. On assessment no obvious signs of deformity noted, +pulses present in LUE, +cap refill. Patient able to lift arm on assessment. Denies chest pain, nausea/vomiting/fever/chills/SOB. Patient states pain radiates to neck.

## 2021-03-30 NOTE — ED PROVIDER NOTE - NSFOLLOWUPCLINICS_GEN_ALL_ED_FT
[facility] ([facility])  Rehabilitation  [facility] [address] [place] [address]  Phone: [phone]  Fax:   Follow Up Time: 1-3 Days    [facility] ([facility])  Rehabilitation  [address] [place] [id number]  Phone: [phone]  Fax:   Follow Up Time: 1-3 Days

## 2021-03-30 NOTE — ED PROVIDER NOTE - PROVIDER TOKENS
PROVIDER:[TOKEN:[[id number]],FOLLOWUP:[1-3 Days]],FREE:[LAST:[YOUR PRIMARY CARE PHYSICIAN],PHONE:[(   )    -],FAX:[(   )    -],FOLLOWUP:[1-3 Days]]

## 2021-03-30 NOTE — ED ADULT NURSE NOTE - NSFALLRSKPASTHIST_ED_ALL_ED
[Cough] : cough [Dyspnea] : dyspnea [Edema] : edema [Negative] : Psychiatric [Fever] : no fever [Chills] : no chills [Hemoptysis] : no hemoptysis [Chest Tightness] : no chest tightness [Sputum] : no sputum [Chest Discomfort] : no chest discomfort no [Orthopnea] : no orthopnea

## 2021-03-30 NOTE — ED PROVIDER NOTE - CARE PROVIDER_API CALL
Juan Buckley)  Neuromuscular Medicine  [address] [place] [address], [address]  [place], [place] [id number]  Phone: [phone]  Fax: [phone]  Follow Up Time: 1-3 Days    YOUR PRIMARY CARE PHYSICIAN,   Phone: (   )    -  Fax: (   )    -  Follow Up Time: 1-3 Days

## 2021-03-30 NOTE — ED PROVIDER NOTE - ATTENDING CONTRIBUTION TO CARE
64 y/o f w/ pmhx of depression, anxiety, htn, hld, lower cbp for which she has been followed up for extensively, presents with L sided neck pain radiating down to L elbow and than hands at times, reports stabbing pain, worse with movement, better at rest, moderate in intensity for about 1-3 days, took 10 mg of oxy yesterday, tyl today at 7 am, with no relief, so came to ed. pt and  at bedside confirm that dog jumped on her on this side and noticed pain shortly after this. No fever, chills, n/v, cp, pleuritic cp, sob, palpitations, diaphoresis, cough, ha/lh/dizziness, numbness/tingling, shoulder, humerus, elbow or wrist pain, no neck stiffness, abd pain, diarrhea, constipation, melena/brbpr, urinary symptoms, saddle paresthesias, urinary or bowel incontinence, edema, calf pain/swelling/erythema, sick contacts, recent travel or rash. no hx of IVDA.       Vital Signs: I have reviewed the initial vital signs. Constitutional: WDWN female uncomfortable 2/2 to L sided neck pain, holding L arm close to body. Sitting on stretcher speaking full sentences. Integumentary: No rash. ENT: MMM NECK: Supple, no meningeal signs. (+) L sided paraspinal neck pain to palpation with pain to palpation over trapezius- hypertonic, pain worse with L sidebending and rotation, (-) Spurling b/l. Cardiovascular: RRR, radial pulses 2/4 b/l. dp and pt pulses 2/4 b/l. No JVD. Respiratory: BS present b/l, ctabl, no wheezing or crackles, no accessory muscle use, no stridor. Gastrointestinal: BS present throughout all 4 quadrants, soft, nd, nt, no rebound tenderness or guarding, no cvat. Musculoskeletal: FROM,  no spinous ttp, no palpable shelves or step-offs, (-) SLR b/l, no foot drop, no edema, no calf pain/swelling/erythema. No pain to palpation to shoulders, wrists, no snuff box tenderness, with FROM of b/l shoulders, elbows, wrists, good finger opposition, no focal deficits. (+) drop arm test. Neurologic: AAOx3, motor 5/5 and sensation intact throughout upper and lower ext, CN II-XII intact, No facial droop or slurring of speech. No focal deficits. 2+ patella and achilles pulses.

## 2021-03-30 NOTE — ED PROVIDER NOTE - MUSCULOSKELETAL, MLM
+ left sided trapezius muscle tenderness without spinous process tenderness; spine appears normal, range of motion is not limited, no joint tenderness

## 2021-03-30 NOTE — ED PROVIDER NOTE - CLINICAL SUMMARY MEDICAL DECISION MAKING FREE TEXT BOX
pt presented with L sided neck pain reproducible with pain down l arm, ekg with no ernesto or depressions, pain improved with meds, pt understands symptomatic treatment, various reasons for pain including cervical radiculopathy, signs and symptoms to return for, follow up with pmd, neurology and rehab, pt comfortable, pain meds sent to pharmacy, reports will follow up.

## 2021-03-30 NOTE — ED PROVIDER NOTE - PATIENT PORTAL LINK FT
You can access the FollowMyHealth Patient Portal offered by [facility] by registering at the following website: [email or web address]. By joining [employer]’s FollowMyHealth portal, you will also be able to view your health information using other applications (apps) compatible with our system.

## 2021-03-30 NOTE — ED PROVIDER NOTE - CARE PROVIDERS DIRECT ADDRESSES
[email or web address]

## 2021-03-30 NOTE — ED PROVIDER NOTE - CARE PLAN
Assessment and plan of treatment:	Plan: EKG, pain control, reassess.   Principal Discharge DX:	Neck pain on left side  Assessment and plan of treatment:	Plan: EKG, pain control, reassess.  Secondary Diagnosis:	Arm pain

## 2021-03-30 NOTE — ED PROVIDER NOTE - NSFOLLOWUPINSTRUCTIONS_ED_ALL_ED_FT
Cervical Radiculopathy    WHAT YOU NEED TO KNOW:    Cervical radiculopathy is a painful condition that happens when a spinal nerve in your neck is pinched or irritated. Vertebral Column         DISCHARGE INSTRUCTIONS:    Medicines: You may need any of the following:    NSAIDs help decrease swelling and pain. This medicine can be bought without a doctor's order. This medicine can cause stomach bleeding or kidney problems in certain people. If you take blood thinner medicine, always ask your healthcare provider if NSAIDs are safe for you. Always read the medicine label and follow the directions on it before using this medicine.      Prescription pain medicine helps decrease pain. Do not wait until the pain is severe before you take this medicine.      Steroids help decrease pain and swelling. These may be given as a pill or as an injection in your neck. You may need more than 1 injection if your symptoms do not improve after the first treatment.       Take your medicine as directed. Contact your healthcare provider if you think your medicine is not helping or if you have side effects. Tell him of her if you are allergic to any medicine. Keep a list of the medicines, vitamins, and herbs you take. Include the amounts, and when and why you take them. Bring the list or the pill bottles to follow-up visits. Carry your medicine list with you in case of an emergency.    Follow up with your healthcare provider or spine specialist as directed: Write down your questions so you remember to ask them during your visits.     Physical therapy: Your healthcare provider may suggest physical therapy to stretch and strengthen your muscles. Your physical therapist can teach you how to improve your posture and the way you hold your neck. He may also teach you how to be safely active and avoid further injury. He can also help you develop an exercise program that is safe for your back and neck.     Self-care:     Ice helps decrease swelling and pain. Ice may also help prevent tissue damage. Use an ice pack, or put crushed ice in a plastic bag. Cover it with a towel and place it on your neck for 15 to 20 minutes every hour or as directed.      Rest when you feel it is needed. Slowly start to do more each day. Return to your daily activities as directed.       Wear a soft collar. You may be given a soft collar to support your neck while you sleep. Wear the soft collar only as directed.Cervical Collars           Do light stretches and regular exercise. Your healthcare provider may suggest light stretches to help decrease stiffness in your neck and arm as you recover. After your pain is controlled, you may benefit from regular exercise. Ask what type of exercise is safe for your back and neck.       Review your work area. A comfortable work area can help prevent neck strain. Ask your employer for an ergonomic review to check the position of your desk, chair, phone, and computer. Make any necessary adjustments for your comfort.     Contact your healthcare provider or spine specialist if:     You have a fever.      You are losing weight without trying.      Your pain is worse, even with medicine.      One or both hands feel more numb than before, or you cannot move your fingers well.      You have questions or concerns about your condition or care.         © Copyright [employer] 2019 All illustrations and images included in CareNotes are the copyrighted property of [employer] Inc. or [employer].

## 2021-04-28 PROBLEM — E78.00 PURE HYPERCHOLESTEROLEMIA, UNSPECIFIED: Chronic | Status: ACTIVE | Noted: 2021-03-30

## 2021-05-27 ENCOUNTER — NON-APPOINTMENT (OUTPATIENT)
Age: 66
End: 2021-05-27

## 2021-05-27 ENCOUNTER — APPOINTMENT (OUTPATIENT)
Dept: SURGERY | Facility: CLINIC | Age: 66
End: 2021-05-27
Payer: COMMERCIAL

## 2021-05-27 VITALS — WEIGHT: 141 LBS | HEIGHT: 64 IN | BODY MASS INDEX: 24.07 KG/M2

## 2021-05-27 PROCEDURE — 99243 OFF/OP CNSLTJ NEW/EST LOW 30: CPT

## 2021-05-27 NOTE — CONSULT LETTER
[Dear  ___] : Dear  [unfilled], [Courtesy Letter:] : I had the pleasure of seeing your patient, [unfilled], in my office today. [Please see my note below.] : Please see my note below. [Consult Closing:] : Thank you very much for allowing me to participate in the care of this patient.  If you have any questions, please do not hesitate to contact me. [FreeTextEntry3] : Respectfully,\par \par Francisco Sarabia M.D., FACS\par

## 2021-05-27 NOTE — ASSESSMENT
[FreeTextEntry1] : Sandi is a pleasant 65-year-old woman with a past medical history significant for hypertension, cigarette smoking and hypercholesterolemia along with a past surgical history significant for an abdominoplasty many years ago now presenting to the office with progressively worsening pain and now swelling in the right groin suspicious for a hernia. She occasionally does heavy lifting and strenuous activity around the house. 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 There is no evidence of incarceration or strangulation, and the patient denies any symptoms of obstruction.  Examination of her left groin demonstrates a mild to moderate weakness but no obvious hernia. Her umbilical examination is unremarkable. Her current BMI is 24.\par \par I explained the pros and cons of surgery, as well as all risks, benefits, indications and alternatives of the procedure and the patient understood and agreed. Sandi was scheduled for the repair of her right inguinal hernia with mesh on Friday, July 9, 2021 under LOCAL with IV SEDATION at [facility] with presurgical testing waived.  She was encouraged to avoid heavy lifting and strenuous activity in the interim, of course. \par \par After our discussion, she is aware of the dangers of cigarette smoking, especially with regard to wound healing, wound complications, hernia development and hernia recurrence.  She was encouraged to quit or minimize smoking in the perioperative period and has agreed to try.\par \par Of note, she will be traveling to [place] in the near future to visit her mother who is sick and she was encouraged to avoid carrying heavy suitcases on this trip. Her  also has a significant periumbilical hernia and he will make an appointment to see me which will coincide with Sandi's postoperative appointment.

## 2021-05-27 NOTE — PHYSICAL EXAM
[Normal Breath Sounds] : Normal breath sounds [No Rash or Lesion] : No rash or lesion [Alert] : alert [Calm] : calm [JVD] : no jugular venous distention  [de-identified] : healthy [de-identified] : normal [de-identified] : soft and flat abdomen\par \par  [de-identified] : right inguinal hernia, reducible

## 2021-07-06 ENCOUNTER — APPOINTMENT (OUTPATIENT)
Age: 66
End: 2021-07-06

## 2021-07-06 ENCOUNTER — OUTPATIENT (OUTPATIENT)
Dept: OUTPATIENT SERVICES | Facility: HOSPITAL | Age: 66
LOS: 1 days | Discharge: HOME | End: 2021-07-06

## 2021-07-06 ENCOUNTER — LABORATORY RESULT (OUTPATIENT)
Age: 66
End: 2021-07-06

## 2021-07-06 DIAGNOSIS — Z11.59 ENCOUNTER FOR SCREENING FOR OTHER VIRAL DISEASES: ICD-10-CM

## 2021-07-08 NOTE — ASU PATIENT PROFILE, ADULT - MEDICATION ADMINISTRATION INFO, PROFILE
lvm for pt informing her appt on 4/17 needs to be changed to 1130, due to scheduling conflicts.  Patient has not been seen since 2017, so it would be considered a new patient visit  
no concerns

## 2021-07-09 ENCOUNTER — APPOINTMENT (OUTPATIENT)
Dept: SURGERY | Facility: AMBULATORY SURGERY CENTER | Age: 66
End: 2021-07-09
Payer: COMMERCIAL

## 2021-07-09 ENCOUNTER — OUTPATIENT (OUTPATIENT)
Dept: OUTPATIENT SERVICES | Facility: HOSPITAL | Age: 66
LOS: 1 days | Discharge: HOME | End: 2021-07-09

## 2021-07-09 VITALS
TEMPERATURE: 99 F | SYSTOLIC BLOOD PRESSURE: 136 MMHG | WEIGHT: 138.01 LBS | HEART RATE: 68 BPM | DIASTOLIC BLOOD PRESSURE: 65 MMHG | OXYGEN SATURATION: 98 % | RESPIRATION RATE: 18 BRPM | HEIGHT: 64 IN

## 2021-07-09 VITALS
HEART RATE: 63 BPM | OXYGEN SATURATION: 100 % | SYSTOLIC BLOOD PRESSURE: 117 MMHG | DIASTOLIC BLOOD PRESSURE: 55 MMHG | RESPIRATION RATE: 20 BRPM

## 2021-07-09 DIAGNOSIS — Z98.891 HISTORY OF UTERINE SCAR FROM PREVIOUS SURGERY: Chronic | ICD-10-CM

## 2021-07-09 DIAGNOSIS — Z98.82 BREAST IMPLANT STATUS: Chronic | ICD-10-CM

## 2021-07-09 PROCEDURE — 49505 PRP I/HERN INIT REDUC >5 YR: CPT | Mod: RT

## 2021-07-09 RX ORDER — ALPRAZOLAM 0.25 MG
1 TABLET ORAL
Qty: 0 | Refills: 0 | DISCHARGE

## 2021-07-09 RX ORDER — OMEPRAZOLE 10 MG/1
1 CAPSULE, DELAYED RELEASE ORAL
Qty: 0 | Refills: 0 | DISCHARGE

## 2021-07-09 RX ORDER — HYDROMORPHONE HYDROCHLORIDE 2 MG/ML
0.5 INJECTION INTRAMUSCULAR; INTRAVENOUS; SUBCUTANEOUS
Refills: 0 | Status: DISCONTINUED | OUTPATIENT
Start: 2021-07-09 | End: 2021-07-09

## 2021-07-09 RX ORDER — OLMESARTAN MEDOXOMIL 5 MG/1
1 TABLET, FILM COATED ORAL
Qty: 0 | Refills: 0 | DISCHARGE

## 2021-07-09 RX ORDER — SULFAMETHOXAZOLE
0 POWDER (GRAM) MISCELLANEOUS
Qty: 0 | Refills: 0 | DISCHARGE

## 2021-07-09 RX ORDER — SERTRALINE 25 MG/1
0 TABLET, FILM COATED ORAL
Qty: 0 | Refills: 0 | DISCHARGE

## 2021-07-09 RX ORDER — METOPROLOL TARTRATE 50 MG
1 TABLET ORAL
Qty: 0 | Refills: 0 | DISCHARGE

## 2021-07-09 RX ORDER — BUPROPION HYDROCHLORIDE 150 MG/1
1 TABLET, EXTENDED RELEASE ORAL
Qty: 0 | Refills: 0 | DISCHARGE

## 2021-07-09 RX ORDER — VENLAFAXINE HCL 75 MG
1 CAPSULE, EXT RELEASE 24 HR ORAL
Qty: 0 | Refills: 0 | DISCHARGE

## 2021-07-09 RX ORDER — SODIUM CHLORIDE 9 MG/ML
1000 INJECTION, SOLUTION INTRAVENOUS
Refills: 0 | Status: DISCONTINUED | OUTPATIENT
Start: 2021-07-09 | End: 2021-07-23

## 2021-07-09 RX ORDER — OXYCODONE AND ACETAMINOPHEN 5; 325 MG/1; MG/1
1 TABLET ORAL EVERY 4 HOURS
Refills: 0 | Status: DISCONTINUED | OUTPATIENT
Start: 2021-07-09 | End: 2021-07-09

## 2021-07-09 RX ORDER — ONDANSETRON 8 MG/1
4 TABLET, FILM COATED ORAL ONCE
Refills: 0 | Status: DISCONTINUED | OUTPATIENT
Start: 2021-07-09 | End: 2021-07-23

## 2021-07-09 RX ORDER — ROSUVASTATIN CALCIUM 5 MG/1
1 TABLET ORAL
Qty: 0 | Refills: 0 | DISCHARGE

## 2021-07-09 RX ORDER — OXYCODONE HYDROCHLORIDE 5 MG/1
1 TABLET ORAL
Qty: 0 | Refills: 0 | DISCHARGE

## 2021-07-09 RX ORDER — IVABRADINE 7.5 MG/1
1 TABLET, FILM COATED ORAL
Qty: 0 | Refills: 0 | DISCHARGE

## 2021-07-09 RX ADMIN — SODIUM CHLORIDE 100 MILLILITER(S): 9 INJECTION, SOLUTION INTRAVENOUS at 09:59

## 2021-07-09 NOTE — ASU DISCHARGE PLAN (ADULT/PEDIATRIC) - ASU DC SPECIAL INSTRUCTIONSFT
Diet    Eat light on the day of surgery. Nausea and vomiting can occur after anesthesia,   but usually resolve within 24 hours.  Resume normal diet the following day.      Activity    Rest!  No heavy lifting or strenuous activity.    Medications    Ibuprofen (Advil, Motrin), Naprosyn (Aleve) or Extra-Strength Tylenol for pain.  Tramadol for severe pain only.  Your prescription was sent electronically to your pharmacy.  Remember, Tramadol is a strong narcotic-like pain reliever which can cause drowsiness, upset stomach and constipation.  It should always be taken with food.  You can use stool softener (Mineral Oil) or laxative (MiraLax or Dulcolax) if constipated.  An antibiotic is given during surgery.  No antibiotic needed at home.   Resume all previous medications.  Resume blood thinners the day after surgery unless told otherwise.    Wound Care    Leave surgical dressing in place.  May shower (dressing is waterproof.)  No pool, ocean, lake, hot-tub or   bath for 3 weeks. If you were given an abdominal binder, please wear it as much as possible (day & night)   for 2 weeks, but you must remove it for showers.  Ice packs to the area intermittently for several days help   with pain and swelling.  Bruising (“black and blue”) is common.  Treatment is…Ice & Rest. Diet    Eat light on the day of surgery. Nausea and vomiting can occur after anesthesia,   but usually resolve within 24 hours.  Resume normal diet the following day.      Activity    Rest!  No heavy lifting or strenuous activity.    Medications    Ibuprofen (Advil, Motrin), Naprosyn (Aleve) or Extra-Strength Tylenol for pain.  Oxycodone for severe pain only.  You have this medication at home if needed.  Remember, oxycodone is a strong narcotic pain reliever which can cause drowsiness, upset stomach and constipation.  It should always be taken with food.  You can use stool softener (Mineral Oil) or laxative (MiraLax or Dulcolax) if constipated.  An antibiotic is given during surgery.  No antibiotic needed at home.   Resume all previous medications.  Resume blood thinners the day after surgery unless told otherwise.    Wound Care    Leave surgical dressing in place.  May shower (dressing is waterproof.)  No pool, ocean, lake, hot-tub or   bath for 3 weeks. If you were given an abdominal binder, please wear it as much as possible (day & night)   for 2 weeks, but you must remove it for showers.  Ice packs to the area intermittently for several days help   with pain and swelling.  Bruising (“black and blue”) is common.  Treatment is…Ice & Rest.

## 2021-07-09 NOTE — ASU DISCHARGE PLAN (ADULT/PEDIATRIC) - PAIN MANAGEMENT
Prescriptions electronically submitted to pharmacy from [facility] Patient has Oxycodone at home if needed for severe pain.

## 2021-07-09 NOTE — BRIEF OPERATIVE NOTE - NSICDXBRIEFOPLAUNCH_GEN_ALL_CORE
Called number on file, no answer    LVM     Please call with urine culture growing > [id number] of E coli indicating a UTI- I will call in augmentin to treat- it is twice a day for 10 days- please let us know if she has questions      
<--- Click to Launch ICDx for PreOp, PostOp and Procedure

## 2021-07-09 NOTE — ASU DISCHARGE PLAN (ADULT/PEDIATRIC) - CARE PROVIDER_API CALL
Francisco Sarabia)  Surgery  [address] [facility] [facility] [address]  [place] [address]  Phone: [phone]  Fax: [phone]  Scheduled Appointment: 07/20/2021

## 2021-07-09 NOTE — PRE-ANESTHESIA EVALUATION ADULT - NSANTHOSAYNRD_GEN_A_CORE
No. JULIETA screening performed.  STOP BANG Legend: 0-2 = LOW Risk; 3-4 = INTERMEDIATE Risk; 5-8 = HIGH Risk

## 2021-07-13 DIAGNOSIS — F17.200 NICOTINE DEPENDENCE, UNSPECIFIED, UNCOMPLICATED: ICD-10-CM

## 2021-07-13 DIAGNOSIS — K66.0 PERITONEAL ADHESIONS (POSTPROCEDURAL) (POSTINFECTION): ICD-10-CM

## 2021-07-13 DIAGNOSIS — K40.90 UNILATERAL INGUINAL HERNIA, WITHOUT OBSTRUCTION OR GANGRENE, NOT SPECIFIED AS RECURRENT: ICD-10-CM

## 2021-07-13 DIAGNOSIS — I10 ESSENTIAL (PRIMARY) HYPERTENSION: ICD-10-CM

## 2021-07-13 DIAGNOSIS — Z88.0 ALLERGY STATUS TO PENICILLIN: ICD-10-CM

## 2021-07-13 DIAGNOSIS — E78.00 PURE HYPERCHOLESTEROLEMIA, UNSPECIFIED: ICD-10-CM

## 2021-07-20 ENCOUNTER — APPOINTMENT (OUTPATIENT)
Dept: SURGERY | Facility: CLINIC | Age: 66
End: 2021-07-20
Payer: COMMERCIAL

## 2021-07-20 DIAGNOSIS — K40.90 UNILATERAL INGUINAL HERNIA, W/OUT OBSTRUCTION OR GANGRENE, NOT SPECIFIED AS RECURRENT: ICD-10-CM

## 2021-07-20 PROCEDURE — 99024 POSTOP FOLLOW-UP VISIT: CPT

## 2021-07-20 NOTE — ASSESSMENT
[FreeTextEntry1] : Sandi underwent the repair of her large indirect right inguinal hernia with mesh on July 9, 2021 under local with IV sedation without any problems or complications. Her wound is clean, dry and intact. There is no evidence of erythema, seroma formation or infection. She is tolerating a diet and having normal bowel movements. She denies any significant postoperative pain or discomfort at this time.\par \par She was counseled and reassured. Sandi was discharged from the office with no specific followup necessary, but she knows to avoid any heavy lifting or strenuous activity for the next several weeks.

## 2021-07-20 NOTE — CONSULT LETTER
[FreeTextEntry1] : Dear Dr. Roro Bueno, \par \par I had the pleasure of seeing your patient, MYRA BELCHER, in my office today. Please see my note below. \par \par Thank you very much for allowing me to participate in the care of this patient. If you have any questions, please do not hesitate to contact me. \par \par \par Respectfully,\par \par Francisco Sarabia M.D., FACS

## 2021-09-27 ENCOUNTER — EMERGENCY (EMERGENCY)
Facility: HOSPITAL | Age: 66
LOS: 0 days | Discharge: HOME | End: 2021-09-27
Attending: EMERGENCY MEDICINE | Admitting: EMERGENCY MEDICINE
Payer: COMMERCIAL

## 2021-09-27 VITALS
RESPIRATION RATE: 18 BRPM | TEMPERATURE: 97 F | OXYGEN SATURATION: 99 % | SYSTOLIC BLOOD PRESSURE: 192 MMHG | DIASTOLIC BLOOD PRESSURE: 81 MMHG | HEART RATE: 67 BPM

## 2021-09-27 VITALS
OXYGEN SATURATION: 99 % | WEIGHT: 138.01 LBS | DIASTOLIC BLOOD PRESSURE: 81 MMHG | SYSTOLIC BLOOD PRESSURE: 171 MMHG | HEART RATE: 81 BPM | HEIGHT: 64 IN | RESPIRATION RATE: 18 BRPM | TEMPERATURE: 98 F

## 2021-09-27 DIAGNOSIS — E78.5 HYPERLIPIDEMIA, UNSPECIFIED: ICD-10-CM

## 2021-09-27 DIAGNOSIS — Z98.82 BREAST IMPLANT STATUS: Chronic | ICD-10-CM

## 2021-09-27 DIAGNOSIS — M54.9 DORSALGIA, UNSPECIFIED: ICD-10-CM

## 2021-09-27 DIAGNOSIS — F41.9 ANXIETY DISORDER, UNSPECIFIED: ICD-10-CM

## 2021-09-27 DIAGNOSIS — I10 ESSENTIAL (PRIMARY) HYPERTENSION: ICD-10-CM

## 2021-09-27 DIAGNOSIS — Z88.0 ALLERGY STATUS TO PENICILLIN: ICD-10-CM

## 2021-09-27 DIAGNOSIS — R10.31 RIGHT LOWER QUADRANT PAIN: ICD-10-CM

## 2021-09-27 DIAGNOSIS — Z87.442 PERSONAL HISTORY OF URINARY CALCULI: ICD-10-CM

## 2021-09-27 DIAGNOSIS — Z98.891 HISTORY OF UTERINE SCAR FROM PREVIOUS SURGERY: Chronic | ICD-10-CM

## 2021-09-27 DIAGNOSIS — F17.200 NICOTINE DEPENDENCE, UNSPECIFIED, UNCOMPLICATED: ICD-10-CM

## 2021-09-27 DIAGNOSIS — R11.0 NAUSEA: ICD-10-CM

## 2021-09-27 LAB
ALBUMIN SERPL ELPH-MCNC: 5 G/DL — SIGNIFICANT CHANGE UP (ref 3.5–5.2)
ALP SERPL-CCNC: 80 U/L — SIGNIFICANT CHANGE UP (ref 30–115)
ALT FLD-CCNC: 12 U/L — SIGNIFICANT CHANGE UP (ref 0–41)
ANION GAP SERPL CALC-SCNC: 14 MMOL/L — SIGNIFICANT CHANGE UP (ref 7–14)
APPEARANCE UR: CLEAR — SIGNIFICANT CHANGE UP
AST SERPL-CCNC: 15 U/L — SIGNIFICANT CHANGE UP (ref 0–41)
BASOPHILS # BLD AUTO: 0.02 K/UL — SIGNIFICANT CHANGE UP (ref 0–0.2)
BASOPHILS NFR BLD AUTO: 0.3 % — SIGNIFICANT CHANGE UP (ref 0–1)
BILIRUB SERPL-MCNC: 0.3 MG/DL — SIGNIFICANT CHANGE UP (ref 0.2–1.2)
BILIRUB UR-MCNC: NEGATIVE — SIGNIFICANT CHANGE UP
BUN SERPL-MCNC: 11 MG/DL — SIGNIFICANT CHANGE UP (ref 10–20)
CALCIUM SERPL-MCNC: 9.5 MG/DL — SIGNIFICANT CHANGE UP (ref 8.5–10.1)
CHLORIDE SERPL-SCNC: 105 MMOL/L — SIGNIFICANT CHANGE UP (ref 98–110)
CO2 SERPL-SCNC: 20 MMOL/L — SIGNIFICANT CHANGE UP (ref 17–32)
COLOR SPEC: COLORLESS — SIGNIFICANT CHANGE UP
CREAT SERPL-MCNC: 0.9 MG/DL — SIGNIFICANT CHANGE UP (ref 0.7–1.5)
DIFF PNL FLD: SIGNIFICANT CHANGE UP
EOSINOPHIL # BLD AUTO: 0.04 K/UL — SIGNIFICANT CHANGE UP (ref 0–0.7)
EOSINOPHIL NFR BLD AUTO: 0.5 % — SIGNIFICANT CHANGE UP (ref 0–8)
GLUCOSE SERPL-MCNC: 102 MG/DL — HIGH (ref 70–99)
GLUCOSE UR QL: NEGATIVE — SIGNIFICANT CHANGE UP
HCT VFR BLD CALC: 38.3 % — SIGNIFICANT CHANGE UP (ref 37–47)
HGB BLD-MCNC: 12.9 G/DL — SIGNIFICANT CHANGE UP (ref 12–16)
IMM GRANULOCYTES NFR BLD AUTO: 0.3 % — SIGNIFICANT CHANGE UP (ref 0.1–0.3)
KETONES UR-MCNC: NEGATIVE — SIGNIFICANT CHANGE UP
LACTATE SERPL-SCNC: 0.8 MMOL/L — SIGNIFICANT CHANGE UP (ref 0.7–2)
LEUKOCYTE ESTERASE UR-ACNC: NEGATIVE — SIGNIFICANT CHANGE UP
LIDOCAIN IGE QN: 18 U/L — SIGNIFICANT CHANGE UP (ref 7–60)
LYMPHOCYTES # BLD AUTO: 1.1 K/UL — LOW (ref 1.2–3.4)
LYMPHOCYTES # BLD AUTO: 14.9 % — LOW (ref 20.5–51.1)
MCHC RBC-ENTMCNC: 30.3 PG — SIGNIFICANT CHANGE UP (ref 27–31)
MCHC RBC-ENTMCNC: 33.7 G/DL — SIGNIFICANT CHANGE UP (ref 32–37)
MCV RBC AUTO: 89.9 FL — SIGNIFICANT CHANGE UP (ref 81–99)
MONOCYTES # BLD AUTO: 0.34 K/UL — SIGNIFICANT CHANGE UP (ref 0.1–0.6)
MONOCYTES NFR BLD AUTO: 4.6 % — SIGNIFICANT CHANGE UP (ref 1.7–9.3)
NEUTROPHILS # BLD AUTO: 5.87 K/UL — SIGNIFICANT CHANGE UP (ref 1.4–6.5)
NEUTROPHILS NFR BLD AUTO: 79.4 % — HIGH (ref 42.2–75.2)
NITRITE UR-MCNC: NEGATIVE — SIGNIFICANT CHANGE UP
NRBC # BLD: 0 /100 WBCS — SIGNIFICANT CHANGE UP (ref 0–0)
PH UR: 6.5 — SIGNIFICANT CHANGE UP (ref 5–8)
PLATELET # BLD AUTO: 172 K/UL — SIGNIFICANT CHANGE UP (ref 130–400)
POTASSIUM SERPL-MCNC: 4.8 MMOL/L — SIGNIFICANT CHANGE UP (ref 3.5–5)
POTASSIUM SERPL-SCNC: 4.8 MMOL/L — SIGNIFICANT CHANGE UP (ref 3.5–5)
PROT SERPL-MCNC: 7 G/DL — SIGNIFICANT CHANGE UP (ref 6–8)
PROT UR-MCNC: NEGATIVE — SIGNIFICANT CHANGE UP
RBC # BLD: 4.26 M/UL — SIGNIFICANT CHANGE UP (ref 4.2–5.4)
RBC # FLD: 13 % — SIGNIFICANT CHANGE UP (ref 11.5–14.5)
SODIUM SERPL-SCNC: 139 MMOL/L — SIGNIFICANT CHANGE UP (ref 135–146)
SP GR SPEC: 1.01 — LOW (ref 1.01–1.03)
UROBILINOGEN FLD QL: SIGNIFICANT CHANGE UP
WBC # BLD: 7.39 K/UL — SIGNIFICANT CHANGE UP (ref 4.8–10.8)
WBC # FLD AUTO: 7.39 K/UL — SIGNIFICANT CHANGE UP (ref 4.8–10.8)

## 2021-09-27 PROCEDURE — 74177 CT ABD & PELVIS W/CONTRAST: CPT | Mod: 26,MA

## 2021-09-27 PROCEDURE — 99285 EMERGENCY DEPT VISIT HI MDM: CPT

## 2021-09-27 RX ORDER — IBUPROFEN 200 MG
1 TABLET ORAL
Qty: 15 | Refills: 0
Start: 2021-09-27 | End: 2021-10-01

## 2021-09-27 RX ORDER — MORPHINE SULFATE 50 MG/1
6 CAPSULE, EXTENDED RELEASE ORAL ONCE
Refills: 0 | Status: DISCONTINUED | OUTPATIENT
Start: 2021-09-27 | End: 2021-09-27

## 2021-09-27 RX ORDER — KETOROLAC TROMETHAMINE 30 MG/ML
30 SYRINGE (ML) INJECTION ONCE
Refills: 0 | Status: DISCONTINUED | OUTPATIENT
Start: 2021-09-27 | End: 2021-09-27

## 2021-09-27 RX ORDER — SODIUM CHLORIDE 9 MG/ML
2000 INJECTION, SOLUTION INTRAVENOUS ONCE
Refills: 0 | Status: COMPLETED | OUTPATIENT
Start: 2021-09-27 | End: 2021-09-27

## 2021-09-27 RX ORDER — ONDANSETRON 8 MG/1
4 TABLET, FILM COATED ORAL ONCE
Refills: 0 | Status: COMPLETED | OUTPATIENT
Start: 2021-09-27 | End: 2021-09-27

## 2021-09-27 RX ORDER — TIZANIDINE 4 MG/1
1 TABLET ORAL
Qty: 15 | Refills: 0
Start: 2021-09-27 | End: 2021-10-01

## 2021-09-27 RX ORDER — MORPHINE SULFATE 50 MG/1
4 CAPSULE, EXTENDED RELEASE ORAL ONCE
Refills: 0 | Status: DISCONTINUED | OUTPATIENT
Start: 2021-09-27 | End: 2021-09-27

## 2021-09-27 RX ADMIN — ONDANSETRON 4 MILLIGRAM(S): 8 TABLET, FILM COATED ORAL at 10:22

## 2021-09-27 RX ADMIN — MORPHINE SULFATE 4 MILLIGRAM(S): 50 CAPSULE, EXTENDED RELEASE ORAL at 12:36

## 2021-09-27 RX ADMIN — SODIUM CHLORIDE 1000 MILLILITER(S): 9 INJECTION, SOLUTION INTRAVENOUS at 10:23

## 2021-09-27 RX ADMIN — Medication 30 MILLIGRAM(S): at 15:34

## 2021-09-27 RX ADMIN — MORPHINE SULFATE 6 MILLIGRAM(S): 50 CAPSULE, EXTENDED RELEASE ORAL at 10:23

## 2021-09-27 NOTE — ED PROVIDER NOTE - PROGRESS NOTE DETAILS
pt now feeling better.  repeat exam markedly improved.  abd soft with no significant abd tenderness currently.  pt now describing that the pain was more in her low back muscle.  no midline vertebral tenderness.  pain is worse with movement.  will give nsaid.  pt given strict return precautions

## 2021-09-27 NOTE — ED ADULT NURSE NOTE - OBJECTIVE STATEMENT
Pt c/o right lower quadrant abdominal pain x6 days with nausea and diarrhea. Denies cp, sob, fevers, chills, cough, vomiting, back pain, urinary symptoms.

## 2021-09-27 NOTE — ED PROVIDER NOTE - PROVIDER TOKENS
FREE:[LAST:[follow up with your PMD in 24-48 hours. Return to ED for worsening symptoms, fever or any other concerns],PHONE:[(   )    -],FAX:[(   )    -]]

## 2021-09-27 NOTE — ED PROVIDER NOTE - OBJECTIVE STATEMENT
Pt with hx of kidney stones, anxiety, HLD, HTN presents with 5-6 days of right sided abd pain that rads to RLQ and to right back. Pain is constant and assoc with Nausea and loose BMs. Denies fever, urinary issues or anorexia

## 2021-09-27 NOTE — ED PROVIDER NOTE - NS ED ROS FT
CONST: No fever, chills or bodyaches  EYES: No pain, redness, drainage or visual changes.  ENT: No ear pain or discharge, nasal discharge or congestion. No sore throat  CARD: No chest pain, palpitations  RESP: No SOB, cough, hemoptysis.   GI: (+)abdominal pain, (+)Nausea  MS: No joint pain, back pain or extremity pain/injury  SKIN: No rashes  NEURO: No headache, dizziness, paresthesias or LOC  : no dysuria

## 2021-09-27 NOTE — ED PROVIDER NOTE - PATIENT PORTAL LINK FT
You can access the FollowMyHealth Patient Portal offered by [facility] by registering at the following website: [email or web address]. By joining [employer]’s FollowMyHealth portal, you will also be able to view your health information using other applications (apps) compatible with our system.

## 2021-09-27 NOTE — ED PROVIDER NOTE - NSFOLLOWUPINSTRUCTIONS_ED_ALL_ED_FT
Abdominal Pain    Many things can cause abdominal pain. Many times, abdominal pain is not caused by a disease and will improve without treatment. Your health care provider will do a physical exam to determine if there is a dangerous cause of your pain; blood tests and imaging may help determine the cause of your pain. However, in many cases, no cause may be found and you may need further testing as an outpatient. Monitor your abdominal pain for any changes.     SEEK IMMEDIATE MEDICAL CARE IF YOU HAVE ANY OF THE FOLLOWING SYMPTOMS: worsening abdominal pain, uncontrollable vomiting, profuse diarrhea, inability to have bowel movements or pass gas, black or bloody stools, fever accompanying chest pain or back pain, or fainting. These symptoms may represent a serious problem that is an emergency. Do not wait to see if the symptoms will go away. Get medical help right away. Call 911 and do not drive yourself to the hospital.  Back Pain    Back pain is very common in adults. The cause of back pain is rarely dangerous and the pain often gets better over time. The cause of your back pain may not be known and may include strain of muscles or ligaments, degeneration of the spinal disks, or arthritis. Occasionally the pain may radiate down your leg(s). Over-the-counter medicines to reduce pain and inflammation are often the most helpful. Stretching and remaining active frequently helps the healing process.     SEEK IMMEDIATE MEDICAL CARE IF YOU HAVE ANY OF THE FOLLOWING SYMPTOMS: bowel or bladder control problems, unusual weakness or numbness in your arms or legs, nausea or vomiting, abdominal pain, fever, dizziness/lightheadedness.

## 2021-09-27 NOTE — ED PROVIDER NOTE - PHYSICAL EXAMINATION
CONST: Well appearing in NAD  EYES: PERRL, EOMI, Sclera and conjunctiva clear.   ENT Oropharynx normal appearing, no erythema or exudates. Uvula midline.  NECK: Non-tender, no meningeal signs  CARD: Normal S1 S2; Normal rate and rhythm  RESP: Equal BS B/L, No wheezes, rhonchi or rales. No distress  GI: Tender RLQ. No CVAT. Neg RUQT.  MS: Normal ROM in all extremities. No midline spinal tenderness.  SKIN: Warm, dry, no acute rashes. Good turgor  NEURO: A&Ox3, No focal deficits. Strength 5/5 with no sensory deficits. Steady gait

## 2021-09-27 NOTE — ED PROVIDER NOTE - CLINICAL SUMMARY MEDICAL DECISION MAKING FREE TEXT BOX
65 yo f here with a few days of right lower abd pain and right low back pain.  no cp, no sob.  + nausea. no vomiting.  awake, alert.  pt in pain.  abd soft with rlq tenderness with guarding, no rebound.   NO midline vertebral tenderness.  + right low back tenderness.  Pain worse with twisting movement.    Work up negative for anything acute.  Exam improved with treatment.  pt observed in the ED for prolonged period.  pt well appearing, nontoxic.  Pt dc with outpatient follow up.  pt given strict follow up and return precautions.  Pt comfortable with plan.  case discussed with pt and family at length.

## 2021-09-27 NOTE — ED PROVIDER NOTE - CARE PROVIDER_API CALL
follow up with your PMD in 24-48 hours. Return to ED for worsening symptoms, fever or any other concerns,   Phone: (   )    -  Fax: (   )    -  Follow Up Time:

## 2021-09-27 NOTE — ED PROVIDER NOTE - ATTENDING CONTRIBUTION TO CARE
65 yo f here with a few days of right lower abd pain.  no cp, no sob.  + nausea. no vomiting.  awake, alert.  pt in pain.  abd soft with rlq tenderness with guarding, no rebound.    p:  labs, ct, supportive care, reassess. 67 yo f with pmh of htn, here with a few days of right lower abd pain.  no cp, no sob.  + nausea. no vomiting.  awake, alert.  pt in pain.  abd soft with rlq tenderness with guarding, no rebound.    p:  labs, ct, supportive care, reassess.

## 2021-10-12 ENCOUNTER — OUTPATIENT (OUTPATIENT)
Dept: OUTPATIENT SERVICES | Facility: HOSPITAL | Age: 66
LOS: 1 days | Discharge: HOME | End: 2021-10-12
Payer: COMMERCIAL

## 2021-10-12 DIAGNOSIS — Z98.891 HISTORY OF UTERINE SCAR FROM PREVIOUS SURGERY: Chronic | ICD-10-CM

## 2021-10-12 DIAGNOSIS — Z98.82 BREAST IMPLANT STATUS: Chronic | ICD-10-CM

## 2021-10-12 DIAGNOSIS — M25.551 PAIN IN RIGHT HIP: ICD-10-CM

## 2021-10-12 PROCEDURE — 73502 X-RAY EXAM HIP UNI 2-3 VIEWS: CPT | Mod: 26,RT

## 2021-11-01 ENCOUNTER — LABORATORY RESULT (OUTPATIENT)
Age: 66
End: 2021-11-01

## 2021-11-02 ENCOUNTER — APPOINTMENT (OUTPATIENT)
Dept: OBGYN | Facility: CLINIC | Age: 66
End: 2021-11-02
Payer: COMMERCIAL

## 2021-11-02 ENCOUNTER — RESULT CHARGE (OUTPATIENT)
Age: 66
End: 2021-11-02

## 2021-11-02 VITALS
OXYGEN SATURATION: 99 % | BODY MASS INDEX: 24.24 KG/M2 | HEART RATE: 70 BPM | HEIGHT: 64 IN | TEMPERATURE: 98 F | DIASTOLIC BLOOD PRESSURE: 76 MMHG | SYSTOLIC BLOOD PRESSURE: 148 MMHG | WEIGHT: 142 LBS

## 2021-11-02 DIAGNOSIS — Z80.1 FAMILY HISTORY OF MALIGNANT NEOPLASM OF TRACHEA, BRONCHUS AND LUNG: ICD-10-CM

## 2021-11-02 DIAGNOSIS — Z87.42 PERSONAL HISTORY OF OTHER DISEASES OF THE FEMALE GENITAL TRACT: ICD-10-CM

## 2021-11-02 DIAGNOSIS — Z86.59 PERSONAL HISTORY OF OTHER MENTAL AND BEHAVIORAL DISORDERS: ICD-10-CM

## 2021-11-02 DIAGNOSIS — Z86.79 PERSONAL HISTORY OF OTHER DISEASES OF THE CIRCULATORY SYSTEM: ICD-10-CM

## 2021-11-02 DIAGNOSIS — R10.2 PELVIC AND PERINEAL PAIN: ICD-10-CM

## 2021-11-02 DIAGNOSIS — Z86.39 PERSONAL HISTORY OF OTHER ENDOCRINE, NUTRITIONAL AND METABOLIC DISEASE: ICD-10-CM

## 2021-11-02 LAB
BILIRUB UR QL STRIP: NEGATIVE
CLARITY UR: CLEAR
COLLECTION METHOD: NORMAL
GLUCOSE UR-MCNC: NEGATIVE
HCG UR QL: 0.2 EU/DL
HGB UR QL STRIP.AUTO: NORMAL
KETONES UR-MCNC: NEGATIVE
LEUKOCYTE ESTERASE UR QL STRIP: NEGATIVE
NITRITE UR QL STRIP: NEGATIVE
PH UR STRIP: 6
PROT UR STRIP-MCNC: NEGATIVE
SP GR UR STRIP: 1.03

## 2021-11-02 PROCEDURE — 81003 URINALYSIS AUTO W/O SCOPE: CPT | Mod: QW

## 2021-11-02 PROCEDURE — 99396 PREV VISIT EST AGE 40-64: CPT

## 2021-11-02 NOTE — HISTORY OF PRESENT ILLNESS
[Gonorrhea test offered] : Gonorrhea test offered [Chlamydia test offered] : Chlamydia test offered [Trichomonas test offered] : Trichomonas test offered [HPV test offered] : HPV test offered [LMP unknown] : LMP unknown [Yes] : pregnancy [unknown] : Patient is unsure of the date of her LMP [postmenopausal] : postmenopausal [TextBox_4] : Pt present for annual gyn exam\par C/o right mid/lateral and right lower back  discomfort for which she was prescribed oxycodone by her PCP\par Hx of multiple abd surgeries [Mammogramdate] : PT UNSURE [BreastSonogramDate] : -0- [PapSmeardate] : PT UNSURE [BoneDensityDate] : -0- [ColonoscopyDate] : 2020 [MensesFreq] : 30 [MensesLength] : 5 [PGHxTotal] : 4 [[facility]] : 4 [[facility] : 4 [FreeTextEntry1] :  x 3, C/S x 1 [TextBox_9] : 11 [TextBox_13] : 30 [TextBox_15] : 5

## 2021-11-02 NOTE — PHYSICAL EXAM
[Examination Of The Breasts] : a normal appearance [] : implants [No Masses] : no breast masses were palpable [Normal] : normal [Absent] : absent [Declined] : Patient declined rectal exam

## 2021-11-02 NOTE — COUNSELING
[Nutrition/ Exercise/ Weight Management] : nutrition, exercise, weight management [Body Image] : body image [Vitamins/Supplements] : vitamins/supplements [Sunscreen] : sunscreen [Smoking Cessation] : smoking cessation [Breast Self Exam] : breast self exam

## 2021-11-02 NOTE — PLAN
[FreeTextEntry1] : Pap/GC/Chlamydia\par TVS\par Mammo\par UA C&S\par Pt advised to f/u with PCP for reassessment of right mid/lateral pain\par RTO 6 mos/prn

## 2021-11-10 NOTE — ED ADULT TRIAGE NOTE - NSWEIGHTCALCTOOLDRUG_GEN_A_CORE
[facility]    Brief Operative Note    Pre-operative diagnosis: Benign prostatic hyperplasia with urinary retention [N40.1, R33.8]  Post-operative diagnosis Same as pre-operative diagnosis/bladder stones    Procedure: Procedure(s):  TRANSURETHRAL RESECTION (TUR) PROSTATE, USING KTP LASER  Cystoscopy, Bladder Stone Removal  Surgeon: Surgeon(s) and Role:     * Guanaco Stewart MD - Primary  Anesthesia: General   Estimated Blood Loss: Less than 10 ml    Drains: None  Specimens: * No specimens in log *  Findings:   None.  Complications: None.  Implants: * No implants in log *        
 used

## 2021-11-12 ENCOUNTER — APPOINTMENT (OUTPATIENT)
Dept: SURGERY | Facility: CLINIC | Age: 66
End: 2021-11-12
Payer: COMMERCIAL

## 2021-11-12 ENCOUNTER — RESULT REVIEW (OUTPATIENT)
Age: 66
End: 2021-11-12

## 2021-11-12 VITALS
WEIGHT: 138 LBS | SYSTOLIC BLOOD PRESSURE: 110 MMHG | HEIGHT: 64 IN | TEMPERATURE: 97.3 F | BODY MASS INDEX: 23.56 KG/M2 | OXYGEN SATURATION: 98 % | HEART RATE: 94 BPM | DIASTOLIC BLOOD PRESSURE: 78 MMHG

## 2021-11-12 PROCEDURE — 99214 OFFICE O/P EST MOD 30 MIN: CPT

## 2021-11-12 RX ORDER — SUCRALFATE 1 G/10ML
1 SUSPENSION ORAL 4 TIMES DAILY
Qty: 1200 | Refills: 0 | Status: ACTIVE | COMMUNITY
Start: 2021-11-12 | End: 1900-01-01

## 2021-11-12 NOTE — ASSESSMENT
[FreeTextEntry1] : Ms. SANDI BELCHER is a 66 year  year old woman. 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  \par Had colonoscopy by kathryn concepcion a GI doctor. - which is normal ~ nov 2020\par had right inguinal hernia surgery by dr jamil in july 2021\par Always were constipated now have diarrhea since july 2021\par was thinking it was about hip pain - xrays are normal  - last week - mri ordered - not done yet\par He has gone to GYN - Dr Dion Baldwin  who did a us ?? she is packed up ?? stomach was . \par -- offiial report - no uterus , normal ovaries,\par pain is worse on bending / walking \par - food makes the pain worse\par - worse in the morning - gets better during the day . - likely constant . \par  pain is like stabbing\par pain started in the back - and coming around .   but now pain starts in the epigastric then goes to the rt flank then goes to the rt groin . \par \par antidepressant - changed a year ago \par pain medication :  oxycodone\par smoking - 1 pack a day \par \par impression : vague abdominal pian \par ?? cause \par \par - epigastric tenderness\par  -- ppi - bid  / carafate\par -- follow up with Dr. Concepcion\par \par - diarrhea / constipation \par -- ? cause\par -- with follow up with DR. concepcion about varrious stool test\par -- with get small bowel series \par \par - flank pain \par -- ?cause\par -- will repeat CT Scan. \par -- follow up with psychiatry \par \par \par We explained in great detail the pathophysiology of the disease process. We uldin diagrams and discussed the workup for diagnosis and management.\par The various options were explained to the patient. The Risk , benefit and alternatives were discussed. We discussed recovery and possible complications.\par The Post operative care was explained to the patient. She was counselled on diet , exercise and wound care.\par We discussed the pathology and surgery with her.\par \par We discussed the importance of close follow up. \par We informed that she needs to follow up in 2 weeks .\par We also informed that she can call us if anything changes or has any questions.\par

## 2021-11-12 NOTE — PHYSICAL EXAM
[Respiratory Effort] : normal respiratory effort [Alert] : alert [Calm] : calm [JVD] : no jugular venous distention  [Purpura] : no purpura  [de-identified] : normal  [de-identified] : normal  [de-identified] : normal \par epigastric tenderness

## 2021-11-12 NOTE — HISTORY OF PRESENT ILLNESS
[de-identified] : Ms. SANDI BELCHER is a 66 year  year old woman. 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  \par Had colonoscopy by kathryn whipple a GI doctor. - which is normal ~ nov 2020\nanci had right inguinal hernia surgery by dr jamil in july 2021\par Always were constipated now have diarrhea since july 2021\par was thinking it was about hip pain - xrays are normal  - last week - mri ordered - not done yet\par He has gone to GYN - Dr Dion Baldwin  who did a us ?? she is packed up ?? stomach was . \par -- offiial report - no uterus , normal ovaries,\par pain is worse on bending / walking \par - food makes the pain worse\par - worse in the morning - gets better during the day . - likely constant . \par  pain is like stabbing\par pain started in the back - and coming around .   but now pain starts in the epigastric then goes to the rt flank then goes to the rt groin . \par

## 2021-11-15 RX ORDER — SUCRALFATE 1 G/1
1 TABLET ORAL 4 TIMES DAILY
Qty: 60 | Refills: 2 | Status: ACTIVE | COMMUNITY
Start: 2021-11-15 | End: 1900-01-01

## 2021-11-22 ENCOUNTER — NON-APPOINTMENT (OUTPATIENT)
Age: 66
End: 2021-11-22

## 2021-12-12 ENCOUNTER — OUTPATIENT (OUTPATIENT)
Dept: OUTPATIENT SERVICES | Facility: HOSPITAL | Age: 66
LOS: 1 days | Discharge: HOME | End: 2021-12-12
Payer: COMMERCIAL

## 2021-12-12 DIAGNOSIS — Z98.891 HISTORY OF UTERINE SCAR FROM PREVIOUS SURGERY: Chronic | ICD-10-CM

## 2021-12-12 DIAGNOSIS — R10.2 PELVIC AND PERINEAL PAIN: ICD-10-CM

## 2021-12-12 DIAGNOSIS — Z98.82 BREAST IMPLANT STATUS: Chronic | ICD-10-CM

## 2021-12-12 PROCEDURE — 74177 CT ABD & PELVIS W/CONTRAST: CPT | Mod: 26

## 2021-12-28 ENCOUNTER — APPOINTMENT (OUTPATIENT)
Dept: SURGERY | Facility: CLINIC | Age: 66
End: 2021-12-28
Payer: COMMERCIAL

## 2021-12-28 DIAGNOSIS — R52 PAIN, UNSPECIFIED: ICD-10-CM

## 2021-12-28 PROCEDURE — 99441: CPT

## 2021-12-28 NOTE — HISTORY OF PRESENT ILLNESS
[de-identified] : Ms. SANDI BELCHER is a 66 year  year old woman. 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  \par Had colonoscopy by kathryn whipple a GI doctor. - which is normal ~ nov 2020\par had right inguinal hernia surgery by dr jamil in july 2021\par Always were constipated now have diarrhea since july 2021\par was thinking it was about hip pain - xrays are normal  - last week - mri ordered - not done yet\par He has gone to GYN - Dr Dion Baldwin  who did a us ?? she is packed up ?? stomach was . \par -- offiial report - no uterus , normal ovaries,\par pain is worse on bending / walking \par - food makes the pain worse\par - worse in the morning - gets better during the day . - likely constant . \par  pain is like stabbing\par pain started in the back - and coming around .   but now pain starts in the epigastric then goes to the rt flank then goes to the rt groin . \par \par antidepressant - changed a year ago \par pain medication :  oxycodone\par smoking - 1 pack a day \par 12/28: tele visit\par pain similar - but still having bad diarrhea\par worried about obstruction. CT done - largely normal - some thickening of jejunum distal to Ligamet of Treitz.

## 2021-12-28 NOTE — PHYSICAL EXAM
[JVD] : no jugular venous distention  [Respiratory Effort] : normal respiratory effort [Purpura] : no purpura  [Alert] : alert [Calm] : calm [de-identified] : normal  [de-identified] : normal  [de-identified] : normal \par epigastric tenderness

## 2021-12-28 NOTE — ASSESSMENT
[FreeTextEntry1] : Ms. SANDI BELCHER is a 66 year  year old woman. 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  \par Had colonoscopy by kathryn concepcion a GI doctor. - which is normal ~ nov 2020\par had right inguinal hernia surgery by dr jamil in july 2021\par Always were constipated now have diarrhea since july 2021\par was thinking it was about hip pain - xrays are normal  - last week - mri ordered - not done yet\par He has gone to GYN - Dr Dion Baldwin  who did a us ?? she is packed up ?? stomach was . \par -- offiial report - no uterus , normal ovaries,\par pain is worse on bending / walking \par - food makes the pain worse\par - worse in the morning - gets better during the day . - likely constant . \par  pain is like stabbing\par pain started in the back - and coming around .   but now pain starts in the epigastric then goes to the rt flank then goes to the rt groin . \par \par antidepressant - changed a year ago \par pain medication :  oxycodone\par smoking - 1 pack a day \par 12/28: tele visit\par pain similar - but still having bad diarrhea\par worried about obstuction. CT done - largely normal - some thickening of jejunum distal to Ligamet of [place]itz. \par \par \par \par impression : vague abdominal pian \par ?? cause \par \par - epigastric tenderness\par  -- ppi - bid  / carafate\par -- follow up with Dr. Concepcion\par \par - diarrhea / constipation \par -- ? cause\par -- with follow up with DR. concepcion about various stool test\par -- with get small bowel series \par \par - flank pain \par -- ?cause\par -- will get ugi \par -- follow up with psychiatry \par \par \par We explained in great detail the pathophysiology of the disease process. We ludin diagrams and discussed the workup for diagnosis and management.\par The various options were explained to the patient. The Risk , benefit and alternatives were discussed. We discussed recovery and possible complications.\par The Post operative care was explained to the patient. She was counselled on diet , exercise and wound care.\par We discussed the pathology and surgery with her.\par \par We discussed the importance of close follow up. \par We informed that she needs to follow up in 2 weeks .\par We also informed that she can call us if anything changes or has any questions.\par

## 2022-01-02 ENCOUNTER — EMERGENCY (EMERGENCY)
Facility: HOSPITAL | Age: 67
LOS: 0 days | Discharge: HOME | End: 2022-01-02
Attending: STUDENT IN AN ORGANIZED HEALTH CARE EDUCATION/TRAINING PROGRAM | Admitting: STUDENT IN AN ORGANIZED HEALTH CARE EDUCATION/TRAINING PROGRAM
Payer: COMMERCIAL

## 2022-01-02 VITALS — HEIGHT: 64 IN

## 2022-01-02 DIAGNOSIS — Z88.0 ALLERGY STATUS TO PENICILLIN: ICD-10-CM

## 2022-01-02 DIAGNOSIS — Z98.891 HISTORY OF UTERINE SCAR FROM PREVIOUS SURGERY: Chronic | ICD-10-CM
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
## 2022-01-02 DIAGNOSIS — S01.511A LACERATION WITHOUT FOREIGN BODY OF LIP, INITIAL ENCOUNTER: ICD-10-CM

## 2022-01-02 DIAGNOSIS — I10 ESSENTIAL (PRIMARY) HYPERTENSION: ICD-10-CM

## 2022-01-02 DIAGNOSIS — Z98.82 BREAST IMPLANT STATUS: Chronic | ICD-10-CM

## 2022-01-02 PROCEDURE — 99284 EMERGENCY DEPT VISIT MOD MDM: CPT

## 2022-01-02 RX ORDER — KETOROLAC TROMETHAMINE 30 MG/ML
30 SYRINGE (ML) INJECTION ONCE
Refills: 0 | Status: DISCONTINUED | OUTPATIENT
Start: 2022-01-02 | End: 2022-01-02

## 2022-01-02 RX ADMIN — Medication 30 MILLIGRAM(S): at 13:23

## 2022-01-02 NOTE — ED PROVIDER NOTE - PHYSICAL EXAMINATION
GENERAL: Well-nourished, Well-developed. NAD.  HEAD: No visible or palpable bumps or hematomas. No ecchymosis behind ears B/L.  ENMT: MMM. No pharyngeal erythema or exudates. Uvula midline. No oral lesions. (+)lower lip laceration.  Neck: Supple. FROM  CVS: Normal S1,S2. No murmurs appreciated on auscultation   RESP: Lungs clear to auscultation B/L. No wheezing, rales, or rhonchi auscultated.  Skin: Warm, Dry. No rashes or lesions. Good cap refill < 2 sec B/L.  Neuro: AA&O x 3. Sensation grossly intact. Strength 5/5 B/L. Gait within normal limits.

## 2022-01-02 NOTE — ED PROVIDER NOTE - PATIENT PORTAL LINK FT
You can access the FollowMyHealth Patient Portal offered by [facility] by registering at the following website: [email or web address]. By joining [employer]’s FollowMyHealth portal, you will also be able to view your health information using other applications (apps) compatible with our system.

## 2022-01-02 NOTE — ED PROVIDER NOTE - OBJECTIVE STATEMENT
67 yo F pmhx htn, utd tdap, presenting to the ED for evaluation of lower lip laceration sustained earlier this am after falling out of bed. Pt denies loc, not on anticoags. Pt spoke to Dr Hutchinson who instructed pt to come to ED for repair. Pt reporting localized pain to site of laceration. Denies any headache, dizziness, numbness/tingling, weakness, nausea, vomiting, neck pain.

## 2022-01-02 NOTE — ED PROVIDER NOTE - CARE PROVIDER_API CALL
Sam Hutchinson  PLASTIC SURGERY  [address] [place] [address]  Phone: [phone]  Fax: [phone]  Follow Up Time: 1-3 Days

## 2022-01-02 NOTE — ED PROVIDER NOTE - NS ED ROS FT
Constitutional: No fever, chills.  Eyes:  No visual changes  ENMT:  No neck pain  Cardiac:  No chest pain  Respiratory:  No cough, SOB  GI:  No nausea, vomiting, diarrhea, abdominal pain.  :  No dysuria, hematuria  MS:  No back pain.  Neuro:  No headache or lightheadedness  Skin:  (+)Lip laceration  Endocrine: No history of thyroid disease or diabetes.  Except as documented in the HPI,  all other systems are negative.

## 2022-01-02 NOTE — ED PROVIDER NOTE - CLINICAL SUMMARY MEDICAL DECISION MAKING FREE TEXT BOX
66 y.o. F PMH of HTN UTD on tdap pw lower lip laceration earlier in the AM after falling out of bed, no LOC, no anticoagulation. Dr. Hutchinson repaired laceration. + lower lip laceration on the inner lip.

## 2022-01-02 NOTE — ED PROVIDER NOTE - NSFOLLOWUPINSTRUCTIONS_ED_ALL_ED_FT
**follow up with Dr Evangelina Meyer in office**    Laceration    A laceration is a cut that goes through all of the layers of the skin and into the tissue that is right under the skin. Some lacerations heal on their own. Others need to be closed with skin adhesive strips, skin glue, stitches (sutures), or staples. Proper laceration care minimizes the risk of infection and helps the laceration to heal better.  If non-absorbable stitches or staples have been placed, they must be taken out within the time frame instructed by your healthcare provider.    SEEK IMMEDIATE MEDICAL CARE IF YOU HAVE ANY OF THE FOLLOWING SYMPTOMS: swelling around the wound, worsening pain, drainage from the wound, red streaking going away from your wound, inability to move finger or toe near the laceration, or discoloration of skin near the laceration.

## 2022-02-12 ENCOUNTER — TRANSCRIPTION ENCOUNTER (OUTPATIENT)
Age: 67
End: 2022-02-12

## 2022-03-14 ENCOUNTER — EMERGENCY (EMERGENCY)
Facility: HOSPITAL | Age: 67
LOS: 0 days | Discharge: HOME | End: 2022-03-14
Attending: EMERGENCY MEDICINE | Admitting: EMERGENCY MEDICINE
Payer: COMMERCIAL

## 2022-03-14 VITALS
SYSTOLIC BLOOD PRESSURE: 167 MMHG | TEMPERATURE: 98 F | OXYGEN SATURATION: 96 % | DIASTOLIC BLOOD PRESSURE: 77 MMHG | HEART RATE: 70 BPM | RESPIRATION RATE: 16 BRPM | WEIGHT: 138.01 LBS | HEIGHT: 64 IN

## 2022-03-14 VITALS
HEART RATE: 82 BPM | RESPIRATION RATE: 18 BRPM | SYSTOLIC BLOOD PRESSURE: 150 MMHG | DIASTOLIC BLOOD PRESSURE: 72 MMHG | TEMPERATURE: 98 F | OXYGEN SATURATION: 98 %

## 2022-03-14 DIAGNOSIS — Z88.0 ALLERGY STATUS TO PENICILLIN: ICD-10-CM

## 2022-03-14 DIAGNOSIS — K52.9 NONINFECTIVE GASTROENTERITIS AND COLITIS, UNSPECIFIED: ICD-10-CM

## 2022-03-14 DIAGNOSIS — Z98.82 BREAST IMPLANT STATUS: Chronic | ICD-10-CM

## 2022-03-14 DIAGNOSIS — R10.9 UNSPECIFIED ABDOMINAL PAIN: ICD-10-CM

## 2022-03-14 DIAGNOSIS — I10 ESSENTIAL (PRIMARY) HYPERTENSION: ICD-10-CM

## 2022-03-14 DIAGNOSIS — Z98.891 HISTORY OF UTERINE SCAR FROM PREVIOUS SURGERY: Chronic | ICD-10-CM

## 2022-03-14 LAB
ALBUMIN SERPL ELPH-MCNC: 5.1 G/DL — SIGNIFICANT CHANGE UP (ref 3.5–5.2)
ALP SERPL-CCNC: 90 U/L — SIGNIFICANT CHANGE UP (ref 30–115)
ALT FLD-CCNC: 18 U/L — SIGNIFICANT CHANGE UP (ref 0–41)
ANION GAP SERPL CALC-SCNC: 11 MMOL/L — SIGNIFICANT CHANGE UP (ref 7–14)
APPEARANCE UR: CLEAR — SIGNIFICANT CHANGE UP
APTT BLD: 34.3 SEC — SIGNIFICANT CHANGE UP (ref 27–39.2)
AST SERPL-CCNC: 17 U/L — SIGNIFICANT CHANGE UP (ref 0–41)
BACTERIA # UR AUTO: ABNORMAL
BASOPHILS # BLD AUTO: 0.04 K/UL — SIGNIFICANT CHANGE UP (ref 0–0.2)
BASOPHILS NFR BLD AUTO: 0.5 % — SIGNIFICANT CHANGE UP (ref 0–1)
BILIRUB SERPL-MCNC: <0.2 MG/DL — SIGNIFICANT CHANGE UP (ref 0.2–1.2)
BILIRUB UR-MCNC: NEGATIVE — SIGNIFICANT CHANGE UP
BUN SERPL-MCNC: 12 MG/DL — SIGNIFICANT CHANGE UP (ref 10–20)
CALCIUM SERPL-MCNC: 9.6 MG/DL — SIGNIFICANT CHANGE UP (ref 8.5–10.1)
CHLORIDE SERPL-SCNC: 103 MMOL/L — SIGNIFICANT CHANGE UP (ref 98–110)
CO2 SERPL-SCNC: 27 MMOL/L — SIGNIFICANT CHANGE UP (ref 17–32)
COLOR SPEC: YELLOW — SIGNIFICANT CHANGE UP
CREAT SERPL-MCNC: 0.8 MG/DL — SIGNIFICANT CHANGE UP (ref 0.7–1.5)
DIFF PNL FLD: ABNORMAL
EGFR: 82 ML/MIN/1.73M2 — SIGNIFICANT CHANGE UP
EOSINOPHIL # BLD AUTO: 0.06 K/UL — SIGNIFICANT CHANGE UP (ref 0–0.7)
EOSINOPHIL NFR BLD AUTO: 0.8 % — SIGNIFICANT CHANGE UP (ref 0–8)
EPI CELLS # UR: ABNORMAL /HPF
GLUCOSE SERPL-MCNC: 99 MG/DL — SIGNIFICANT CHANGE UP (ref 70–99)
GLUCOSE UR QL: NEGATIVE MG/DL — SIGNIFICANT CHANGE UP
HCT VFR BLD CALC: 39 % — SIGNIFICANT CHANGE UP (ref 37–47)
HGB BLD-MCNC: 13 G/DL — SIGNIFICANT CHANGE UP (ref 12–16)
IMM GRANULOCYTES NFR BLD AUTO: 0.3 % — SIGNIFICANT CHANGE UP (ref 0.1–0.3)
INR BLD: 0.98 RATIO — SIGNIFICANT CHANGE UP (ref 0.65–1.3)
KETONES UR-MCNC: NEGATIVE — SIGNIFICANT CHANGE UP
LEUKOCYTE ESTERASE UR-ACNC: NEGATIVE — SIGNIFICANT CHANGE UP
LIDOCAIN IGE QN: 24 U/L — SIGNIFICANT CHANGE UP (ref 7–60)
LYMPHOCYTES # BLD AUTO: 2.14 K/UL — SIGNIFICANT CHANGE UP (ref 1.2–3.4)
LYMPHOCYTES # BLD AUTO: 26.9 % — SIGNIFICANT CHANGE UP (ref 20.5–51.1)
MCHC RBC-ENTMCNC: 30 PG — SIGNIFICANT CHANGE UP (ref 27–31)
MCHC RBC-ENTMCNC: 33.3 G/DL — SIGNIFICANT CHANGE UP (ref 32–37)
MCV RBC AUTO: 90.1 FL — SIGNIFICANT CHANGE UP (ref 81–99)
MONOCYTES # BLD AUTO: 0.43 K/UL — SIGNIFICANT CHANGE UP (ref 0.1–0.6)
MONOCYTES NFR BLD AUTO: 5.4 % — SIGNIFICANT CHANGE UP (ref 1.7–9.3)
NEUTROPHILS # BLD AUTO: 5.26 K/UL — SIGNIFICANT CHANGE UP (ref 1.4–6.5)
NEUTROPHILS NFR BLD AUTO: 66.1 % — SIGNIFICANT CHANGE UP (ref 42.2–75.2)
NITRITE UR-MCNC: NEGATIVE — SIGNIFICANT CHANGE UP
NRBC # BLD: 0 /100 WBCS — SIGNIFICANT CHANGE UP (ref 0–0)
PH UR: 6.5 — SIGNIFICANT CHANGE UP (ref 5–8)
PLATELET # BLD AUTO: 201 K/UL — SIGNIFICANT CHANGE UP (ref 130–400)
POTASSIUM SERPL-MCNC: 4.5 MMOL/L — SIGNIFICANT CHANGE UP (ref 3.5–5)
POTASSIUM SERPL-SCNC: 4.5 MMOL/L — SIGNIFICANT CHANGE UP (ref 3.5–5)
PROT SERPL-MCNC: 7.1 G/DL — SIGNIFICANT CHANGE UP (ref 6–8)
PROT UR-MCNC: NEGATIVE MG/DL — SIGNIFICANT CHANGE UP
PROTHROM AB SERPL-ACNC: 11.3 SEC — SIGNIFICANT CHANGE UP (ref 9.95–12.87)
RBC # BLD: 4.33 M/UL — SIGNIFICANT CHANGE UP (ref 4.2–5.4)
RBC # FLD: 13.2 % — SIGNIFICANT CHANGE UP (ref 11.5–14.5)
RBC CASTS # UR COMP ASSIST: ABNORMAL /HPF
SARS-COV-2 RNA SPEC QL NAA+PROBE: SIGNIFICANT CHANGE UP
SODIUM SERPL-SCNC: 141 MMOL/L — SIGNIFICANT CHANGE UP (ref 135–146)
SP GR SPEC: 1.02 — SIGNIFICANT CHANGE UP (ref 1.01–1.03)
UROBILINOGEN FLD QL: 0.2 MG/DL — SIGNIFICANT CHANGE UP
WBC # BLD: 7.95 K/UL — SIGNIFICANT CHANGE UP (ref 4.8–10.8)
WBC # FLD AUTO: 7.95 K/UL — SIGNIFICANT CHANGE UP (ref 4.8–10.8)
WBC UR QL: SIGNIFICANT CHANGE UP /HPF

## 2022-03-14 PROCEDURE — 74177 CT ABD & PELVIS W/CONTRAST: CPT | Mod: 26,MA

## 2022-03-14 PROCEDURE — 99285 EMERGENCY DEPT VISIT HI MDM: CPT

## 2022-03-14 RX ORDER — MORPHINE SULFATE 50 MG/1
4 CAPSULE, EXTENDED RELEASE ORAL ONCE
Refills: 0 | Status: DISCONTINUED | OUTPATIENT
Start: 2022-03-14 | End: 2022-03-14

## 2022-03-14 RX ORDER — OXYCODONE AND ACETAMINOPHEN 5; 325 MG/1; MG/1
1 TABLET ORAL ONCE
Refills: 0 | Status: DISCONTINUED | OUTPATIENT
Start: 2022-03-14 | End: 2022-03-14

## 2022-03-14 RX ORDER — MOXIFLOXACIN HYDROCHLORIDE TABLETS, 400 MG 400 MG/1
1 TABLET, FILM COATED ORAL
Qty: 20 | Refills: 0
Start: 2022-03-14 | End: 2022-03-23

## 2022-03-14 RX ORDER — METRONIDAZOLE 500 MG
1 TABLET ORAL
Qty: 30 | Refills: 0
Start: 2022-03-14 | End: 2022-03-23

## 2022-03-14 RX ADMIN — MORPHINE SULFATE 4 MILLIGRAM(S): 50 CAPSULE, EXTENDED RELEASE ORAL at 14:43

## 2022-03-14 RX ADMIN — OXYCODONE AND ACETAMINOPHEN 1 TABLET(S): 5; 325 TABLET ORAL at 17:22

## 2022-03-14 NOTE — ED PROVIDER NOTE - CLINICAL SUMMARY MEDICAL DECISION MAKING FREE TEXT BOX
66-year-old female presenting for intermittent right lower quadrant pain for the past year.  States that she has had normal EGDs and colonoscopies during this time span.  Went to PMD today, told to come in for further evaluation.  No fevers or chills.  No urinary symptoms.  Well appearing, NAD, non toxic. NCAT PERRLA EOMI neck supple non tender normal wob cta bl rrr abdomen s + right lower quadrant labs imaging reviewed.  Patient with reliable outpatient follow-up.  Aware of all results, given a copy of all available results, comfortable with discharge and follow-up outpatient, strict return precautions given. Endorses understanding and aware they can return at any time for further evaluation. No further questions or concerns at this time.  Nd no rebound no guarding WWPx4 neuro non focal

## 2022-03-14 NOTE — ED PROVIDER NOTE - NS ED ROS FT
Review of Systems:  	•	CONSTITUTIONAL - no fever, no diaphoresis, no chills  	•	SKIN - no rash  	•	HEMATOLOGIC - no bleeding, no bruising  	•	EYES - no eye pain, no blurry vision  	•	ENT - no change in hearing, no sore throat, no ear pain or tinnitus  	•	RESPIRATORY - no shortness of breath, no cough  	•	CARDIAC - no chest pain, no palpitations  	•	GI -positive  abd pain, no nausea, no vomiting, no diarrhea, no constipation  	•	GENITO-URINARY - no discharge, no dysuria; no hematuria, no increased urinary frequency  	•	MUSCULOSKELETAL - no joint paint, no swelling, no redness  	•	NEUROLOGIC - no weakness, no headache, no paresthesias, no LOC  	•	PSYCH - no anxiety, non suicidal, non homicidal, no hallucination, no depression

## 2022-03-14 NOTE — ED ADULT TRIAGE NOTE - CHIEF COMPLAINT QUOTE
"I have right lower abdominal pain. It's been going on for a year and it's getting progressively worse. I have seen numerous specialists but no one is able to determine the cause of the pain." Pt has history of a tummy tuck. Pt to see Dr. Haskins today and directed to ER "Acute RLQ abdominal pain r/o appendix; r/o ovarian cyst. Needs CT abdomen/pelvis."

## 2022-03-14 NOTE — ED PROVIDER NOTE - OBJECTIVE STATEMENT
this is a 65 yo female presents to ed for evaluation of abdominal pain. patient states pain is going on for long time on and off. patient went to dr boo today and was told to go to ed for further evaluation.

## 2022-03-14 NOTE — ED PROVIDER NOTE - CARE PROVIDER_API CALL
Regina Barrett (MD)  Gastroenterology  [address] [place] [address]  Phone: [phone]  Fax: [phone]  Follow Up Time:

## 2022-03-14 NOTE — ED PROVIDER NOTE - PATIENT PORTAL LINK FT
You can access the FollowMyHealth Patient Portal offered by [facility] by registering at the following website: [email or web address]. By joining [employer]’s FollowMyHealth portal, you will also be able to view your health information using other applications (apps) compatible with our system.

## 2022-03-15 LAB
CULTURE RESULTS: SIGNIFICANT CHANGE UP
SPECIMEN SOURCE: SIGNIFICANT CHANGE UP

## 2022-05-06 ENCOUNTER — EMERGENCY (EMERGENCY)
Facility: HOSPITAL | Age: 67
LOS: 0 days | Discharge: HOME | End: 2022-05-06
Attending: EMERGENCY MEDICINE | Admitting: EMERGENCY MEDICINE
Payer: COMMERCIAL

## 2022-05-06 VITALS
OXYGEN SATURATION: 100 % | WEIGHT: 134.04 LBS | RESPIRATION RATE: 18 BRPM | TEMPERATURE: 98 F | HEART RATE: 88 BPM | DIASTOLIC BLOOD PRESSURE: 68 MMHG | HEIGHT: 64 IN | SYSTOLIC BLOOD PRESSURE: 122 MMHG

## 2022-05-06 VITALS
DIASTOLIC BLOOD PRESSURE: 60 MMHG | OXYGEN SATURATION: 99 % | RESPIRATION RATE: 16 BRPM | HEART RATE: 80 BPM | SYSTOLIC BLOOD PRESSURE: 135 MMHG

## 2022-05-06 DIAGNOSIS — R19.7 DIARRHEA, UNSPECIFIED: ICD-10-CM

## 2022-05-06 DIAGNOSIS — R11.2 NAUSEA WITH VOMITING, UNSPECIFIED: ICD-10-CM

## 2022-05-06 DIAGNOSIS — Z98.891 HISTORY OF UTERINE SCAR FROM PREVIOUS SURGERY: Chronic | ICD-10-CM

## 2022-05-06 DIAGNOSIS — Z20.822 CONTACT WITH AND (SUSPECTED) EXPOSURE TO COVID-19: ICD-10-CM

## 2022-05-06 DIAGNOSIS — R10.9 UNSPECIFIED ABDOMINAL PAIN: ICD-10-CM

## 2022-05-06 DIAGNOSIS — K52.9 NONINFECTIVE GASTROENTERITIS AND COLITIS, UNSPECIFIED: ICD-10-CM

## 2022-05-06 DIAGNOSIS — Z88.0 ALLERGY STATUS TO PENICILLIN: ICD-10-CM

## 2022-05-06 DIAGNOSIS — Z98.82 BREAST IMPLANT STATUS: Chronic | ICD-10-CM

## 2022-05-06 LAB
ALBUMIN SERPL ELPH-MCNC: 4.5 G/DL — SIGNIFICANT CHANGE UP (ref 3.5–5.2)
ALP SERPL-CCNC: 93 U/L — SIGNIFICANT CHANGE UP (ref 30–115)
ALT FLD-CCNC: 9 U/L — SIGNIFICANT CHANGE UP (ref 0–41)
ANION GAP SERPL CALC-SCNC: 14 MMOL/L — SIGNIFICANT CHANGE UP (ref 7–14)
APPEARANCE UR: CLEAR — SIGNIFICANT CHANGE UP
AST SERPL-CCNC: 10 U/L — SIGNIFICANT CHANGE UP (ref 0–41)
BACTERIA # UR AUTO: ABNORMAL
BASOPHILS # BLD AUTO: 0.03 K/UL — SIGNIFICANT CHANGE UP (ref 0–0.2)
BASOPHILS NFR BLD AUTO: 0.2 % — SIGNIFICANT CHANGE UP (ref 0–1)
BILIRUB DIRECT SERPL-MCNC: <0.2 MG/DL — SIGNIFICANT CHANGE UP (ref 0–0.3)
BILIRUB INDIRECT FLD-MCNC: >0.2 MG/DL — SIGNIFICANT CHANGE UP (ref 0.2–1.2)
BILIRUB SERPL-MCNC: 0.4 MG/DL — SIGNIFICANT CHANGE UP (ref 0.2–1.2)
BILIRUB UR-MCNC: ABNORMAL
BLD GP AB SCN SERPL QL: SIGNIFICANT CHANGE UP
BUN SERPL-MCNC: 17 MG/DL — SIGNIFICANT CHANGE UP (ref 10–20)
CALCIUM SERPL-MCNC: 9.8 MG/DL — SIGNIFICANT CHANGE UP (ref 8.5–10.1)
CHLORIDE SERPL-SCNC: 102 MMOL/L — SIGNIFICANT CHANGE UP (ref 98–110)
CO2 SERPL-SCNC: 28 MMOL/L — SIGNIFICANT CHANGE UP (ref 17–32)
COD CRY URNS QL: NEGATIVE — SIGNIFICANT CHANGE UP
COLOR SPEC: YELLOW — SIGNIFICANT CHANGE UP
CREAT SERPL-MCNC: 0.9 MG/DL — SIGNIFICANT CHANGE UP (ref 0.7–1.5)
DIFF PNL FLD: ABNORMAL
EGFR: 71 ML/MIN/1.73M2 — SIGNIFICANT CHANGE UP
EOSINOPHIL # BLD AUTO: 0.1 K/UL — SIGNIFICANT CHANGE UP (ref 0–0.7)
EOSINOPHIL NFR BLD AUTO: 0.6 % — SIGNIFICANT CHANGE UP (ref 0–8)
EPI CELLS # UR: ABNORMAL /HPF
GLUCOSE SERPL-MCNC: 99 MG/DL — SIGNIFICANT CHANGE UP (ref 70–99)
GLUCOSE UR QL: NEGATIVE MG/DL — SIGNIFICANT CHANGE UP
GRAN CASTS # UR COMP ASSIST: NEGATIVE — SIGNIFICANT CHANGE UP
HCT VFR BLD CALC: 40.6 % — SIGNIFICANT CHANGE UP (ref 37–47)
HGB BLD-MCNC: 14.2 G/DL — SIGNIFICANT CHANGE UP (ref 12–16)
HYALINE CASTS # UR AUTO: NEGATIVE — SIGNIFICANT CHANGE UP
IMM GRANULOCYTES NFR BLD AUTO: 0.4 % — HIGH (ref 0.1–0.3)
KETONES UR-MCNC: ABNORMAL
LACTATE SERPL-SCNC: 0.9 MMOL/L — SIGNIFICANT CHANGE UP (ref 0.7–2)
LEUKOCYTE ESTERASE UR-ACNC: NEGATIVE — SIGNIFICANT CHANGE UP
LIDOCAIN IGE QN: 14 U/L — SIGNIFICANT CHANGE UP (ref 7–60)
LYMPHOCYTES # BLD AUTO: 1.47 K/UL — SIGNIFICANT CHANGE UP (ref 1.2–3.4)
LYMPHOCYTES # BLD AUTO: 9.4 % — LOW (ref 20.5–51.1)
MCHC RBC-ENTMCNC: 31.5 PG — HIGH (ref 27–31)
MCHC RBC-ENTMCNC: 35 G/DL — SIGNIFICANT CHANGE UP (ref 32–37)
MCV RBC AUTO: 90 FL — SIGNIFICANT CHANGE UP (ref 81–99)
MONOCYTES # BLD AUTO: 0.71 K/UL — HIGH (ref 0.1–0.6)
MONOCYTES NFR BLD AUTO: 4.5 % — SIGNIFICANT CHANGE UP (ref 1.7–9.3)
NEUTROPHILS # BLD AUTO: 13.3 K/UL — HIGH (ref 1.4–6.5)
NEUTROPHILS NFR BLD AUTO: 84.9 % — HIGH (ref 42.2–75.2)
NITRITE UR-MCNC: NEGATIVE — SIGNIFICANT CHANGE UP
NRBC # BLD: 0 /100 WBCS — SIGNIFICANT CHANGE UP (ref 0–0)
PH UR: 6.5 — SIGNIFICANT CHANGE UP (ref 5–8)
PLATELET # BLD AUTO: 216 K/UL — SIGNIFICANT CHANGE UP (ref 130–400)
POTASSIUM SERPL-MCNC: 3.4 MMOL/L — LOW (ref 3.5–5)
POTASSIUM SERPL-SCNC: 3.4 MMOL/L — LOW (ref 3.5–5)
PROT SERPL-MCNC: 6.7 G/DL — SIGNIFICANT CHANGE UP (ref 6–8)
PROT UR-MCNC: 100 MG/DL
RBC # BLD: 4.51 M/UL — SIGNIFICANT CHANGE UP (ref 4.2–5.4)
RBC # FLD: 12.9 % — SIGNIFICANT CHANGE UP (ref 11.5–14.5)
RBC CASTS # UR COMP ASSIST: ABNORMAL /HPF
SARS-COV-2 RNA SPEC QL NAA+PROBE: SIGNIFICANT CHANGE UP
SODIUM SERPL-SCNC: 144 MMOL/L — SIGNIFICANT CHANGE UP (ref 135–146)
SP GR SPEC: 1.02 — SIGNIFICANT CHANGE UP (ref 1.01–1.03)
TRI-PHOS CRY UR QL COMP ASSIST: NEGATIVE — SIGNIFICANT CHANGE UP
URATE CRY FLD QL MICRO: NEGATIVE — SIGNIFICANT CHANGE UP
UROBILINOGEN FLD QL: 0.2 MG/DL — SIGNIFICANT CHANGE UP
WBC # BLD: 15.68 K/UL — HIGH (ref 4.8–10.8)
WBC # FLD AUTO: 15.68 K/UL — HIGH (ref 4.8–10.8)
WBC UR QL: SIGNIFICANT CHANGE UP /HPF

## 2022-05-06 PROCEDURE — 93010 ELECTROCARDIOGRAM REPORT: CPT

## 2022-05-06 PROCEDURE — 74177 CT ABD & PELVIS W/CONTRAST: CPT | Mod: 26,MA

## 2022-05-06 PROCEDURE — 99285 EMERGENCY DEPT VISIT HI MDM: CPT

## 2022-05-06 RX ORDER — ONDANSETRON 8 MG/1
4 TABLET, FILM COATED ORAL ONCE
Refills: 0 | Status: COMPLETED | OUTPATIENT
Start: 2022-05-06 | End: 2022-05-06

## 2022-05-06 RX ORDER — MORPHINE SULFATE 50 MG/1
6 CAPSULE, EXTENDED RELEASE ORAL ONCE
Refills: 0 | Status: DISCONTINUED | OUTPATIENT
Start: 2022-05-06 | End: 2022-05-06

## 2022-05-06 RX ORDER — LOPERAMIDE HCL 2 MG
1 TABLET ORAL
Qty: 21 | Refills: 0
Start: 2022-05-06 | End: 2022-05-12

## 2022-05-06 RX ORDER — SODIUM CHLORIDE 9 MG/ML
1000 INJECTION INTRAMUSCULAR; INTRAVENOUS; SUBCUTANEOUS ONCE
Refills: 0 | Status: COMPLETED | OUTPATIENT
Start: 2022-05-06 | End: 2022-05-06

## 2022-05-06 RX ORDER — MORPHINE SULFATE 50 MG/1
4 CAPSULE, EXTENDED RELEASE ORAL ONCE
Refills: 0 | Status: DISCONTINUED | OUTPATIENT
Start: 2022-05-06 | End: 2022-05-06

## 2022-05-06 RX ADMIN — ONDANSETRON 4 MILLIGRAM(S): 8 TABLET, FILM COATED ORAL at 11:20

## 2022-05-06 RX ADMIN — MORPHINE SULFATE 6 MILLIGRAM(S): 50 CAPSULE, EXTENDED RELEASE ORAL at 11:42

## 2022-05-06 RX ADMIN — MORPHINE SULFATE 4 MILLIGRAM(S): 50 CAPSULE, EXTENDED RELEASE ORAL at 13:42

## 2022-05-06 RX ADMIN — SODIUM CHLORIDE 1000 MILLILITER(S): 9 INJECTION INTRAMUSCULAR; INTRAVENOUS; SUBCUTANEOUS at 11:19

## 2022-05-06 NOTE — ED PROVIDER NOTE - OBJECTIVE STATEMENT
66F hx of colitis presents with abdominal pain, endorses several days of vomiting/diarrhea, denies fevers, notes she had similar symptoms a few months ago when she was diagnosed with colitis.

## 2022-05-06 NOTE — ED PROVIDER NOTE - ATTENDING CONTRIBUTION TO CARE
Patient is a 66-year-old female with history of colitis few months ago and recent negative colonoscopy who comes in for 3 days of fever with nausea and dark diarrhea.  Notes diffuse abdominal discomfort.  Similar to previous episodes.    Exam: Soft abdomen with lower abdominal tenderness without guarding.  No acute distress, no CVA tenderness,  Plan: Labs, urinalysis, IV fluids

## 2022-05-06 NOTE — ED PROVIDER NOTE - PHYSICAL EXAMINATION
Vital Signs: I have reviewed the initial vital signs.  Constitutional: appears stated age, no acute distress.  HEENT: Airway patent, moist MM, no erythema/swelling/deformity of oral structures. EOMI, PERRLA.  CV: regular rate, regular rhythm, well-perfused extremities, 2+ b/l DP and radial pulses equal.  Lungs: BCTA, no increased WOB.  ABD: soft, mild LLQ ttp, ND, no guarding or rebound, no pulsatile mass, no hernias, no flank pain.   MSK: Neck supple, nontender, nl ROM, no stepoff. Chest nontender. Back nontender in TLS spine or to b/l bony structures. Ext nontender, nl rom, no deformity.   INTEG: Skin warm, dry, no rash.  NEURO: A&Ox3, moving all extremities, normal speech  PSYCH: Calm, cooperative, normal affect and interaction.

## 2022-05-06 NOTE — ED PROVIDER NOTE - PATIENT PORTAL LINK FT
You can access the FollowMyHealth Patient Portal offered by [facility] by registering at the following website: [email or web address]. By joining [employer]’s FollowMyHealth portal, you will also be able to view your health information using other applications (apps) compatible with our system.

## 2022-05-07 LAB
CULTURE RESULTS: SIGNIFICANT CHANGE UP
SPECIMEN SOURCE: SIGNIFICANT CHANGE UP

## 2022-09-28 NOTE — ASU PREOP CHECKLIST - BOWEL PREP
n/a

## 2023-04-14 NOTE — ED ADULT NURSE NOTE - SUICIDE SCREENING QUESTION 3
-- TAMILASHAWNY wife (Emergency Contact) [phone]    04/14/2023 04:31 PM CDT Phone (Incoming) TAMIYUNIEL wife (Emergency Contact) [phone]        Alternative phone number: NO    Can a detailed message be left? Yes    Message Turnaround: [place]:    Refer to site's KB page for routing instructions    Please give this turnaround time to the caller:   \"You can expect to receive a response 1-3 business days after your provider's clinical team reviews the message\"               No

## 2023-07-16 ENCOUNTER — EMERGENCY (EMERGENCY)
Facility: HOSPITAL | Age: 68
LOS: 0 days | Discharge: HOME | End: 2023-07-16
Attending: EMERGENCY MEDICINE
Payer: MEDICARE

## 2023-07-16 VITALS
DIASTOLIC BLOOD PRESSURE: 79 MMHG | OXYGEN SATURATION: 100 % | HEART RATE: 80 BPM | SYSTOLIC BLOOD PRESSURE: 167 MMHG | TEMPERATURE: 98 F | RESPIRATION RATE: 18 BRPM

## 2023-07-16 DIAGNOSIS — Z87.11 PERSONAL HISTORY OF PEPTIC ULCER DISEASE: ICD-10-CM

## 2023-07-16 DIAGNOSIS — Z88.0 ALLERGY STATUS TO PENICILLIN: ICD-10-CM

## 2023-07-16 DIAGNOSIS — R06.2 WHEEZING: ICD-10-CM

## 2023-07-16 DIAGNOSIS — Z98.891 HISTORY OF UTERINE SCAR FROM PREVIOUS SURGERY: Chronic | ICD-10-CM

## 2023-07-16 DIAGNOSIS — M25.571 PAIN IN RIGHT ANKLE AND JOINTS OF RIGHT FOOT: ICD-10-CM
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
## 2023-07-16 DIAGNOSIS — Z98.82 BREAST IMPLANT STATUS: Chronic | ICD-10-CM

## 2023-07-16 DIAGNOSIS — S90.121A CONTUSION OF RIGHT LESSER TOE(S) WITHOUT DAMAGE TO NAIL, INITIAL ENCOUNTER: ICD-10-CM

## 2023-07-16 DIAGNOSIS — Z87.19 PERSONAL HISTORY OF OTHER DISEASES OF THE DIGESTIVE SYSTEM: ICD-10-CM

## 2023-07-16 PROCEDURE — 73630 X-RAY EXAM OF FOOT: CPT | Mod: 26,RT

## 2023-07-16 PROCEDURE — 99284 EMERGENCY DEPT VISIT MOD MDM: CPT | Mod: 25

## 2023-07-16 PROCEDURE — 73610 X-RAY EXAM OF ANKLE: CPT | Mod: RT

## 2023-07-16 PROCEDURE — 73630 X-RAY EXAM OF FOOT: CPT | Mod: RT

## 2023-07-16 PROCEDURE — 73610 X-RAY EXAM OF ANKLE: CPT | Mod: 26,RT

## 2023-07-16 PROCEDURE — 29515 APPLICATION SHORT LEG SPLINT: CPT | Mod: RT

## 2023-07-16 NOTE — ED PROVIDER NOTE - PROGRESS NOTE DETAILS
SG: pt much improved. No retractions. Decreased wheezing diffusely noted. Requesting DC. Will continue to monitor and reassess.

## 2023-07-16 NOTE — ED PROVIDER NOTE - PATIENT PORTAL LINK FT
You can access the FollowMyHealth Patient Portal offered by [facility] by registering at the following website: [email or web address]. By joining [employer]’s FollowMyHealth portal, you will also be able to view your health information using other applications (apps) compatible with our system.

## 2023-07-16 NOTE — ED ADULT NURSE NOTE - NSFALLCONCLUSION_ED_ALL_ED
negative Universal Safety Interventions normal/ROM intact/normal gait/strength 5/5 bilateral upper extremities/strength 5/5 bilateral lower extremities

## 2023-07-16 NOTE — ED PROVIDER NOTE - OBJECTIVE STATEMENT
67-year-old female past medical history of gastric ulcers coming with complaints of right ankle pain.  Patient reports that she had mechanical fall down 3 steps in which she twisted her right ankle and banged her knee, only complaining of right ankle pain at this time.  Unable to ambulate as normal due to the pain.

## 2023-07-16 NOTE — ED PROCEDURE NOTE - CPROC ED COMPLICATIONS1
Essential hypertension Dementia Dementia Essential hypertension no Dementia Essential hypertension Essential hypertension

## 2023-07-16 NOTE — ED PROVIDER NOTE - ATTENDING CONTRIBUTION TO CARE
I have personally performed a history and physical exam on this patient and personally directed the management of the patient. Patient is a 67-year-old female past medical history of peptic ulcer disease presents for evaluation of right ankle pain after a fall while tripping on steps pain is moderate throbbing in nature worse with movement patient has trouble bearing weight no LOC no vomiting denies any headache visual changes neck pain chest pain abdominal pain or other back pain    On physical exam patient is normocephalic atraumatic pupils equal round react form completion extraocular muscles intact oropharynx clear chest clear to auscultation bilaterally abdomen soft nontender nondistended bowel sounds positive patient has tenderness to palpation to the right fifth with mild bruising mild swelling pedal pulses 2+ capillary refills normal left knee reveals no signs of effusion no signs of infection    Assessment plan we obtained x-rays from her independent evaluation axis with fracture however given her clinical exam patient placed in a splint we discussed RICE therapy follow-up to orthopedics in the next 24 to 48 hours

## 2023-07-16 NOTE — ED PROVIDER NOTE - NSFOLLOWUPINSTRUCTIONS_ED_ALL_ED_FT
Please follow-up with PCP in 1 to 3 days. Return precautions explained in full to patient/family.    Sprain    A sprain is a stretch or tear in one of the tough, fiber-like tissues (ligaments) in your body. This is caused by an injury to the area such as a twisting mechanism. Symptoms include pain, swelling, or bruising. Rest that area over the next several days and slowly resume activity when tolerated. Ice can help with swelling and pain.     SEEK IMMEDIATE MEDICAL CARE IF YOU HAVE ANY OF THE FOLLOWING SYMPTOMS: worsening pain, inability to move that body part, numbness or tingling.

## 2023-07-16 NOTE — ED PROVIDER NOTE - CLINICAL SUMMARY MEDICAL DECISION MAKING FREE TEXT BOX
we obtained x-rays from her independent evaluation axis with fracture however given her clinical exam patient placed in a splint we discussed RICE therapy follow-up to orthopedics in the next 24 to 48 hours

## 2023-07-16 NOTE — ED PROCEDURE NOTE - NS ED ATTENDING STATEMENT MOD
This was a shared visit with the CHAU. I reviewed and verified the documentation and independently performed the documented: Attending with

## 2023-07-16 NOTE — ED PROVIDER NOTE - NSFOLLOWUPCLINICS_GEN_ALL_ED_FT
[facility]  Orthpedic  [address], [place]   Phone: [phone]  Fax:   Follow Up Time: 4-6 Days

## 2023-07-16 NOTE — ED PROVIDER NOTE - PHYSICAL EXAMINATION
CONSTITUTIONAL: NAD  SKIN: Warm dry  HEAD: NCAT  EYES: NL inspection  EXT: mild right foot swelling, TTP on lateral aspect of foot pad under 4/5th MTP, neurovascularly intact, FROM of ankle and knee, knee nontender.   NEURO: Grossly unremarkable  PSYCH: Cooperative, appropriate.

## 2023-07-18 ENCOUNTER — APPOINTMENT (OUTPATIENT)
Dept: ORTHOPEDIC SURGERY | Facility: CLINIC | Age: 68
End: 2023-07-18
Payer: MEDICARE

## 2023-07-18 VITALS — HEIGHT: 64 IN | BODY MASS INDEX: 23.9 KG/M2 | WEIGHT: 140 LBS

## 2023-07-18 DIAGNOSIS — S93.601A UNSPECIFIED SPRAIN OF RIGHT FOOT, INITIAL ENCOUNTER: ICD-10-CM

## 2023-07-18 PROCEDURE — 99203 OFFICE O/P NEW LOW 30 MIN: CPT

## 2023-07-18 PROCEDURE — L4361: CPT | Mod: RT,KX

## 2023-07-18 NOTE — DATA REVIEWED
[FreeTextEntry1] : Images were obtained at [facility] emergency room and viewed on PACS here.  AP, lateral, oblique views of the right foot and ankle reveal no acute fractures, dislocations, bony abnormalities

## 2023-07-18 NOTE — PHYSICAL EXAM
[de-identified] : Physical exam of the right foot:\par -Mild swelling noted over the lateral aspect of the foot, over the cuboid.  Skin intact\par -She is tender over the area of swelling.  No lateral or medial ligament pain, no lateral or medial malleolus pain\par -Patient has full range of motion of foot and ankle with some discomfort\par -+2 dorsalis pedis pulse \par -Sensation intact to light touch

## 2023-07-18 NOTE — DISCUSSION/SUMMARY
[de-identified] : Patient has a sprain of the right foot.  Diagnosis x-ray images were discussed with patient and her .  At this time, patient is placed in a short cam walking boot to weight-bear as tolerated for the next 2 weeks.  I also encourage patient to modify her activity, elevate the foot at rest, and to ice the foot for the rest of the week, then she could transition to warm water and Epsom salt soaks.  Patient was encouraged to take anti-inflammatories, as this will help pain and swelling, but if she cannot tolerate it due to the side effects, she can continue taking Tylenol.  Patient will follow-up with me in approximately 3 to 4 weeks if she is not feeling better.  Patient is agreeable to the above plan and all questions were answered today

## 2023-07-18 NOTE — HISTORY OF PRESENT ILLNESS
[de-identified] : 67-year-old female presents with right foot pain.  On 7/16/2023, patient fell down a few steps.  Patient subsequently went to [facility] emergency room, where she was was placed in a short leg splint.  Patient states the pain is intermittent in nature.  Patient denies any past injuries or surgeries to the foot.  Patient has been taking Tylenol for pain relief.  Patient states anti-inflammatories irritate her stomach so she avoids Advil no is much as possible.

## 2023-08-17 ENCOUNTER — APPOINTMENT (OUTPATIENT)
Dept: ORTHOPEDIC SURGERY | Facility: CLINIC | Age: 68
End: 2023-08-17

## 2023-08-29 ENCOUNTER — APPOINTMENT (OUTPATIENT)
Dept: ORTHOPEDIC SURGERY | Facility: CLINIC | Age: 68
End: 2023-08-29

## 2023-11-10 ENCOUNTER — EMERGENCY (EMERGENCY)
Facility: HOSPITAL | Age: 68
LOS: 0 days | Discharge: HOME | End: 2023-11-10
Attending: STUDENT IN AN ORGANIZED HEALTH CARE EDUCATION/TRAINING PROGRAM
Payer: MEDICARE

## 2023-11-10 VITALS
TEMPERATURE: 98 F | HEART RATE: 92 BPM | RESPIRATION RATE: 18 BRPM | OXYGEN SATURATION: 99 % | WEIGHT: 149.91 LBS | SYSTOLIC BLOOD PRESSURE: 186 MMHG | DIASTOLIC BLOOD PRESSURE: 91 MMHG

## 2023-11-10 DIAGNOSIS — R10.31 RIGHT LOWER QUADRANT PAIN: ICD-10-CM

## 2023-11-10 DIAGNOSIS — Z98.891 HISTORY OF UTERINE SCAR FROM PREVIOUS SURGERY: Chronic | ICD-10-CM

## 2023-11-10 DIAGNOSIS — Z98.82 BREAST IMPLANT STATUS: Chronic | ICD-10-CM

## 2023-11-10 LAB
ALBUMIN SERPL ELPH-MCNC: 5.1 G/DL — SIGNIFICANT CHANGE UP (ref 3.5–5.2)
ALBUMIN SERPL ELPH-MCNC: 5.1 G/DL — SIGNIFICANT CHANGE UP (ref 3.5–5.2)
ALP SERPL-CCNC: 83 U/L — SIGNIFICANT CHANGE UP (ref 30–115)
ALP SERPL-CCNC: 83 U/L — SIGNIFICANT CHANGE UP (ref 30–115)
ALT FLD-CCNC: 20 U/L — SIGNIFICANT CHANGE UP (ref 0–41)
ALT FLD-CCNC: 20 U/L — SIGNIFICANT CHANGE UP (ref 0–41)
ANION GAP SERPL CALC-SCNC: 11 MMOL/L — SIGNIFICANT CHANGE UP (ref 7–14)
ANION GAP SERPL CALC-SCNC: 11 MMOL/L — SIGNIFICANT CHANGE UP (ref 7–14)
APPEARANCE UR: CLEAR — SIGNIFICANT CHANGE UP
APPEARANCE UR: CLEAR — SIGNIFICANT CHANGE UP
APTT BLD: 29.8 SEC — SIGNIFICANT CHANGE UP (ref 27–39.2)
APTT BLD: 29.8 SEC — SIGNIFICANT CHANGE UP (ref 27–39.2)
AST SERPL-CCNC: 19 U/L — SIGNIFICANT CHANGE UP (ref 0–41)
AST SERPL-CCNC: 19 U/L — SIGNIFICANT CHANGE UP (ref 0–41)
BASOPHILS # BLD AUTO: 0.03 K/UL — SIGNIFICANT CHANGE UP (ref 0–0.2)
BASOPHILS # BLD AUTO: 0.03 K/UL — SIGNIFICANT CHANGE UP (ref 0–0.2)
BASOPHILS NFR BLD AUTO: 0.5 % — SIGNIFICANT CHANGE UP (ref 0–1)
BASOPHILS NFR BLD AUTO: 0.5 % — SIGNIFICANT CHANGE UP (ref 0–1)
BILIRUB SERPL-MCNC: 0.2 MG/DL — SIGNIFICANT CHANGE UP (ref 0.2–1.2)
BILIRUB SERPL-MCNC: 0.2 MG/DL — SIGNIFICANT CHANGE UP (ref 0.2–1.2)
BILIRUB UR-MCNC: NEGATIVE — SIGNIFICANT CHANGE UP
BILIRUB UR-MCNC: NEGATIVE — SIGNIFICANT CHANGE UP
BUN SERPL-MCNC: 17 MG/DL — SIGNIFICANT CHANGE UP (ref 10–20)
BUN SERPL-MCNC: 17 MG/DL — SIGNIFICANT CHANGE UP (ref 10–20)
CALCIUM SERPL-MCNC: 9.6 MG/DL — SIGNIFICANT CHANGE UP (ref 8.4–10.5)
CALCIUM SERPL-MCNC: 9.6 MG/DL — SIGNIFICANT CHANGE UP (ref 8.4–10.5)
CHLORIDE SERPL-SCNC: 103 MMOL/L — SIGNIFICANT CHANGE UP (ref 98–110)
CHLORIDE SERPL-SCNC: 103 MMOL/L — SIGNIFICANT CHANGE UP (ref 98–110)
CO2 SERPL-SCNC: 27 MMOL/L — SIGNIFICANT CHANGE UP (ref 17–32)
CO2 SERPL-SCNC: 27 MMOL/L — SIGNIFICANT CHANGE UP (ref 17–32)
COLOR SPEC: YELLOW — SIGNIFICANT CHANGE UP
COLOR SPEC: YELLOW — SIGNIFICANT CHANGE UP
CREAT SERPL-MCNC: 0.9 MG/DL — SIGNIFICANT CHANGE UP (ref 0.7–1.5)
CREAT SERPL-MCNC: 0.9 MG/DL — SIGNIFICANT CHANGE UP (ref 0.7–1.5)
DIFF PNL FLD: NEGATIVE — SIGNIFICANT CHANGE UP
DIFF PNL FLD: NEGATIVE — SIGNIFICANT CHANGE UP
EGFR: 71 ML/MIN/1.73M2 — SIGNIFICANT CHANGE UP
EGFR: 71 ML/MIN/1.73M2 — SIGNIFICANT CHANGE UP
EOSINOPHIL # BLD AUTO: 0.09 K/UL — SIGNIFICANT CHANGE UP (ref 0–0.7)
EOSINOPHIL # BLD AUTO: 0.09 K/UL — SIGNIFICANT CHANGE UP (ref 0–0.7)
EOSINOPHIL NFR BLD AUTO: 1.4 % — SIGNIFICANT CHANGE UP (ref 0–8)
EOSINOPHIL NFR BLD AUTO: 1.4 % — SIGNIFICANT CHANGE UP (ref 0–8)
GLUCOSE SERPL-MCNC: 92 MG/DL — SIGNIFICANT CHANGE UP (ref 70–99)
GLUCOSE SERPL-MCNC: 92 MG/DL — SIGNIFICANT CHANGE UP (ref 70–99)
GLUCOSE UR QL: NEGATIVE MG/DL — SIGNIFICANT CHANGE UP
GLUCOSE UR QL: NEGATIVE MG/DL — SIGNIFICANT CHANGE UP
HCT VFR BLD CALC: 39.4 % — SIGNIFICANT CHANGE UP (ref 37–47)
HCT VFR BLD CALC: 39.4 % — SIGNIFICANT CHANGE UP (ref 37–47)
HGB BLD-MCNC: 12.9 G/DL — SIGNIFICANT CHANGE UP (ref 12–16)
HGB BLD-MCNC: 12.9 G/DL — SIGNIFICANT CHANGE UP (ref 12–16)
IMM GRANULOCYTES NFR BLD AUTO: 0.2 % — SIGNIFICANT CHANGE UP (ref 0.1–0.3)
IMM GRANULOCYTES NFR BLD AUTO: 0.2 % — SIGNIFICANT CHANGE UP (ref 0.1–0.3)
INR BLD: 0.89 RATIO — SIGNIFICANT CHANGE UP (ref 0.65–1.3)
INR BLD: 0.89 RATIO — SIGNIFICANT CHANGE UP (ref 0.65–1.3)
KETONES UR-MCNC: NEGATIVE MG/DL — SIGNIFICANT CHANGE UP
KETONES UR-MCNC: NEGATIVE MG/DL — SIGNIFICANT CHANGE UP
LEUKOCYTE ESTERASE UR-ACNC: NEGATIVE — SIGNIFICANT CHANGE UP
LEUKOCYTE ESTERASE UR-ACNC: NEGATIVE — SIGNIFICANT CHANGE UP
LIDOCAIN IGE QN: 35 U/L — SIGNIFICANT CHANGE UP (ref 7–60)
LIDOCAIN IGE QN: 35 U/L — SIGNIFICANT CHANGE UP (ref 7–60)
LYMPHOCYTES # BLD AUTO: 1.97 K/UL — SIGNIFICANT CHANGE UP (ref 1.2–3.4)
LYMPHOCYTES # BLD AUTO: 1.97 K/UL — SIGNIFICANT CHANGE UP (ref 1.2–3.4)
LYMPHOCYTES # BLD AUTO: 31 % — SIGNIFICANT CHANGE UP (ref 20.5–51.1)
LYMPHOCYTES # BLD AUTO: 31 % — SIGNIFICANT CHANGE UP (ref 20.5–51.1)
MAGNESIUM SERPL-MCNC: 2.1 MG/DL — SIGNIFICANT CHANGE UP (ref 1.8–2.4)
MAGNESIUM SERPL-MCNC: 2.1 MG/DL — SIGNIFICANT CHANGE UP (ref 1.8–2.4)
MCHC RBC-ENTMCNC: 30.1 PG — SIGNIFICANT CHANGE UP (ref 27–31)
MCHC RBC-ENTMCNC: 30.1 PG — SIGNIFICANT CHANGE UP (ref 27–31)
MCHC RBC-ENTMCNC: 32.7 G/DL — SIGNIFICANT CHANGE UP (ref 32–37)
MCHC RBC-ENTMCNC: 32.7 G/DL — SIGNIFICANT CHANGE UP (ref 32–37)
MCV RBC AUTO: 91.8 FL — SIGNIFICANT CHANGE UP (ref 81–99)
MCV RBC AUTO: 91.8 FL — SIGNIFICANT CHANGE UP (ref 81–99)
MONOCYTES # BLD AUTO: 0.44 K/UL — SIGNIFICANT CHANGE UP (ref 0.1–0.6)
MONOCYTES # BLD AUTO: 0.44 K/UL — SIGNIFICANT CHANGE UP (ref 0.1–0.6)
MONOCYTES NFR BLD AUTO: 6.9 % — SIGNIFICANT CHANGE UP (ref 1.7–9.3)
MONOCYTES NFR BLD AUTO: 6.9 % — SIGNIFICANT CHANGE UP (ref 1.7–9.3)
NEUTROPHILS # BLD AUTO: 3.82 K/UL — SIGNIFICANT CHANGE UP (ref 1.4–6.5)
NEUTROPHILS # BLD AUTO: 3.82 K/UL — SIGNIFICANT CHANGE UP (ref 1.4–6.5)
NEUTROPHILS NFR BLD AUTO: 60 % — SIGNIFICANT CHANGE UP (ref 42.2–75.2)
NEUTROPHILS NFR BLD AUTO: 60 % — SIGNIFICANT CHANGE UP (ref 42.2–75.2)
NITRITE UR-MCNC: NEGATIVE — SIGNIFICANT CHANGE UP
NITRITE UR-MCNC: NEGATIVE — SIGNIFICANT CHANGE UP
NRBC # BLD: 0 /100 WBCS — SIGNIFICANT CHANGE UP (ref 0–0)
NRBC # BLD: 0 /100 WBCS — SIGNIFICANT CHANGE UP (ref 0–0)
PH UR: 6 — SIGNIFICANT CHANGE UP (ref 5–8)
PH UR: 6 — SIGNIFICANT CHANGE UP (ref 5–8)
PLATELET # BLD AUTO: 203 K/UL — SIGNIFICANT CHANGE UP (ref 130–400)
PLATELET # BLD AUTO: 203 K/UL — SIGNIFICANT CHANGE UP (ref 130–400)
PMV BLD: 10.5 FL — HIGH (ref 7.4–10.4)
PMV BLD: 10.5 FL — HIGH (ref 7.4–10.4)
POTASSIUM SERPL-MCNC: 4.3 MMOL/L — SIGNIFICANT CHANGE UP (ref 3.5–5)
POTASSIUM SERPL-MCNC: 4.3 MMOL/L — SIGNIFICANT CHANGE UP (ref 3.5–5)
POTASSIUM SERPL-SCNC: 4.3 MMOL/L — SIGNIFICANT CHANGE UP (ref 3.5–5)
POTASSIUM SERPL-SCNC: 4.3 MMOL/L — SIGNIFICANT CHANGE UP (ref 3.5–5)
PROT SERPL-MCNC: 7.3 G/DL — SIGNIFICANT CHANGE UP (ref 6–8)
PROT SERPL-MCNC: 7.3 G/DL — SIGNIFICANT CHANGE UP (ref 6–8)
PROT UR-MCNC: NEGATIVE MG/DL — SIGNIFICANT CHANGE UP
PROT UR-MCNC: NEGATIVE MG/DL — SIGNIFICANT CHANGE UP
PROTHROM AB SERPL-ACNC: 10.1 SEC — SIGNIFICANT CHANGE UP (ref 9.95–12.87)
PROTHROM AB SERPL-ACNC: 10.1 SEC — SIGNIFICANT CHANGE UP (ref 9.95–12.87)
RBC # BLD: 4.29 M/UL — SIGNIFICANT CHANGE UP (ref 4.2–5.4)
RBC # BLD: 4.29 M/UL — SIGNIFICANT CHANGE UP (ref 4.2–5.4)
RBC # FLD: 12.3 % — SIGNIFICANT CHANGE UP (ref 11.5–14.5)
RBC # FLD: 12.3 % — SIGNIFICANT CHANGE UP (ref 11.5–14.5)
SODIUM SERPL-SCNC: 141 MMOL/L — SIGNIFICANT CHANGE UP (ref 135–146)
SODIUM SERPL-SCNC: 141 MMOL/L — SIGNIFICANT CHANGE UP (ref 135–146)
SP GR SPEC: 1.01 — SIGNIFICANT CHANGE UP (ref 1–1.03)
SP GR SPEC: 1.01 — SIGNIFICANT CHANGE UP (ref 1–1.03)
UROBILINOGEN FLD QL: 0.2 MG/DL — SIGNIFICANT CHANGE UP (ref 0.2–1)
UROBILINOGEN FLD QL: 0.2 MG/DL — SIGNIFICANT CHANGE UP (ref 0.2–1)
WBC # BLD: 6.36 K/UL — SIGNIFICANT CHANGE UP (ref 4.8–10.8)
WBC # BLD: 6.36 K/UL — SIGNIFICANT CHANGE UP (ref 4.8–10.8)
WBC # FLD AUTO: 6.36 K/UL — SIGNIFICANT CHANGE UP (ref 4.8–10.8)
WBC # FLD AUTO: 6.36 K/UL — SIGNIFICANT CHANGE UP (ref 4.8–10.8)

## 2023-11-10 PROCEDURE — 96376 TX/PRO/DX INJ SAME DRUG ADON: CPT

## 2023-11-10 PROCEDURE — 36415 COLL VENOUS BLD VENIPUNCTURE: CPT

## 2023-11-10 PROCEDURE — 74177 CT ABD & PELVIS W/CONTRAST: CPT | Mod: MA

## 2023-11-10 PROCEDURE — 83735 ASSAY OF MAGNESIUM: CPT

## 2023-11-10 PROCEDURE — 86901 BLOOD TYPING SEROLOGIC RH(D): CPT

## 2023-11-10 PROCEDURE — 96374 THER/PROPH/DIAG INJ IV PUSH: CPT | Mod: XU

## 2023-11-10 PROCEDURE — 85025 COMPLETE CBC W/AUTO DIFF WBC: CPT

## 2023-11-10 PROCEDURE — 99284 EMERGENCY DEPT VISIT MOD MDM: CPT | Mod: 25

## 2023-11-10 PROCEDURE — 99285 EMERGENCY DEPT VISIT HI MDM: CPT | Mod: FS

## 2023-11-10 PROCEDURE — 86900 BLOOD TYPING SEROLOGIC ABO: CPT

## 2023-11-10 PROCEDURE — 74177 CT ABD & PELVIS W/CONTRAST: CPT | Mod: 26,MA

## 2023-11-10 PROCEDURE — 80053 COMPREHEN METABOLIC PANEL: CPT

## 2023-11-10 PROCEDURE — 81003 URINALYSIS AUTO W/O SCOPE: CPT

## 2023-11-10 PROCEDURE — 86850 RBC ANTIBODY SCREEN: CPT

## 2023-11-10 PROCEDURE — 85610 PROTHROMBIN TIME: CPT

## 2023-11-10 PROCEDURE — 85730 THROMBOPLASTIN TIME PARTIAL: CPT

## 2023-11-10 PROCEDURE — 96375 TX/PRO/DX INJ NEW DRUG ADDON: CPT

## 2023-11-10 PROCEDURE — 83690 ASSAY OF LIPASE: CPT

## 2023-11-10 RX ORDER — MORPHINE SULFATE 50 MG/1
4 CAPSULE, EXTENDED RELEASE ORAL ONCE
Refills: 0 | Status: DISCONTINUED | OUTPATIENT
Start: 2023-11-10 | End: 2023-11-10

## 2023-11-10 RX ORDER — SODIUM CHLORIDE 9 MG/ML
1000 INJECTION INTRAMUSCULAR; INTRAVENOUS; SUBCUTANEOUS ONCE
Refills: 0 | Status: COMPLETED | OUTPATIENT
Start: 2023-11-10 | End: 2023-11-10

## 2023-11-10 RX ORDER — KETOROLAC TROMETHAMINE 30 MG/ML
30 SYRINGE (ML) INJECTION ONCE
Refills: 0 | Status: DISCONTINUED | OUTPATIENT
Start: 2023-11-10 | End: 2023-11-10

## 2023-11-10 RX ORDER — ONDANSETRON 8 MG/1
4 TABLET, FILM COATED ORAL ONCE
Refills: 0 | Status: COMPLETED | OUTPATIENT
Start: 2023-11-10 | End: 2023-11-10

## 2023-11-10 RX ADMIN — MORPHINE SULFATE 4 MILLIGRAM(S): 50 CAPSULE, EXTENDED RELEASE ORAL at 11:20

## 2023-11-10 RX ADMIN — Medication 30 MILLIGRAM(S): at 18:37

## 2023-11-10 RX ADMIN — ONDANSETRON 4 MILLIGRAM(S): 8 TABLET, FILM COATED ORAL at 11:19

## 2023-11-10 RX ADMIN — SODIUM CHLORIDE 2000 MILLILITER(S): 9 INJECTION INTRAMUSCULAR; INTRAVENOUS; SUBCUTANEOUS at 14:13

## 2023-11-10 RX ADMIN — MORPHINE SULFATE 4 MILLIGRAM(S): 50 CAPSULE, EXTENDED RELEASE ORAL at 14:13

## 2023-11-10 NOTE — ED ADULT NURSE NOTE - NSFALLRISKINTERV_ED_ALL_ED

## 2023-11-10 NOTE — ED PROVIDER NOTE - PROGRESS NOTE DETAILS
CO- pt endorsed to Dr. Arthur- f/u ct and ua, reassess, dispo Labs unremarkable.  CT normal.  UA shows no evidence of UTI.  Meds given in ED and symptoms improved.  Patient is stable for discharge.

## 2023-11-10 NOTE — ED PROVIDER NOTE - PHYSICAL EXAMINATION
CONSTITUTIONAL: in no apparent distress.   ENT: Hearing is intact with good acuity to spoken voice.  Patient is speaking clearly, not muffled and airway is intact.   RESPIRATORY: No signs of respiratory distress. Lung sounds are clear in all lobes bilaterally without rales, rhonchi, or wheezes.  CARDIOVASCULAR: Regular rate and rhythm.   GI: tender to palpation in the RLQ. Abdomen is soft, non-tender, and without distention. Bowel sounds are present and normoactive in all four quadrants. No masses are noted.   BACK: No evidence of trauma or deformity. No midline tenderness. No CVA tenderness bilaterally. Normal ROM.   NEURO: A & O x 3. Normal speech. No focal deficit.  PSYCHOLOGICAL: Appropriate mood and affect. Good judgement and insight.

## 2023-11-10 NOTE — ED PROVIDER NOTE - OBJECTIVE STATEMENT
68-year-old female with past medical history of epigastric ulcer disease, hypertension, hyperlipidemia, and depression who presented to the ED with a right lower quadrant abdominal pain.  Reports that symptoms started 3 years ago; pain is intermittent, became more severe last night, radiating to the right flank, and there is no alleviating or worsening factors.  Denies recent trauma or injury to her back, saddle anesthesia, fever, chest pain, shortness of breath, nausea, vomiting, abdominal pain, urinary symptoms, and change with bowel movement.  Denies loss of bladder/bowel control.

## 2023-11-10 NOTE — ED PROVIDER NOTE - NS ED MD DISPO DISCHARGE CCDA
Phone call to patient.  Left 2nd message requesting call back.  Patient/Caregiver provided printed discharge information.

## 2023-11-10 NOTE — ED PROVIDER NOTE - MDM PATIENT STATEMENT FOR ADDL TREATMENT
Patient with one or more new problems requiring additional work-up/treatment.
Detail Level: Detailed

## 2023-11-10 NOTE — ED PROVIDER NOTE - ATTENDING APP SHARED VISIT CONTRIBUTION OF CARE
69 yo f hx htn  pt presents for eval of R sided ap. pain began 3 years ago and has been intermittent. pain began more RLQ and has migrated to R flank.  pt states she will feel pain for weeks and then will be fine for weeks without specific trigger or alleviating factor. pt states pain was worse than normal prompting eval.  no fevers, chills, dysuria, trauma, vomiting, cp    vss  gen- NAD, aaox3  card-rrr  lungs-ctab, no wheezing or rhonchi  abd-RLQ/R flank tenderness, no guarding or rebound  neuro- full str/sensation, cn ii-xii grossly intact, normal coordination

## 2023-11-10 NOTE — ED PROVIDER NOTE - NS ED ATTENDING STATEMENT MOD
This was a shared visit with the CHAU. I reviewed and verified the documentation and independently performed the documented:

## 2023-11-10 NOTE — ED PROVIDER NOTE - PROVIDER TOKENS
PROVIDER:[TOKEN:[[id number]],FOLLOWUP:[1-3 Days]],PROVIDER:[TOKEN:[[id number]],FOLLOWUP:[1-3 Days]]

## 2023-11-10 NOTE — ED PROVIDER NOTE - CARE PROVIDER_API CALL
Gui Nicole  Pain Medicine  [address] [place] [address]  Phone: [phone]  Fax: [phone]  Follow Up Time: 1-3 Days    Regina Barrett  Gastroenterology  [address] [place] [address]  Phone: [phone]  Fax: [phone]  Follow Up Time: 1-3 Days

## 2023-11-10 NOTE — ED PROVIDER NOTE - CARE PROVIDERS DIRECT ADDRESSES
[email or web address]

## 2023-11-10 NOTE — ED PROVIDER NOTE - CLINICAL SUMMARY MEDICAL DECISION MAKING FREE TEXT BOX
67 yo female presents for right sided abdominal pain began 3 years ago and has been intermittent. pain began more RLQ and has migrated to R flank.  Labs  were ordered and reviewed.  Imaging was ordered and reviewed by me.  Patient was found to have an incidental finding on their imaging in the emergency department today.  A copy of the result was given to the patient, and they were advised to follow up with her primary doctor.  They are aware that this may be very important and may require further work-up and repeat imaging.  All questions and concerns about this finding were addressed.   Appropriate medications for patient's presenting complaints were ordered and effects were reassessed. Patient feels much better and is comfortable with discharge.  Appropriate follow-up was recommended. Stable for discharge.

## 2023-11-10 NOTE — ED PROVIDER NOTE - PATIENT PORTAL LINK FT
You can access the FollowMyHealth Patient Portal offered by [facility] by registering at the following website: [email or web address]. By joining [employer]’s FollowMyHealth portal, you will also be able to view your health information using other applications (apps) compatible with our system.

## 2023-11-27 ENCOUNTER — OUTPATIENT (OUTPATIENT)
Dept: OUTPATIENT SERVICES | Facility: HOSPITAL | Age: 68
LOS: 1 days | End: 2023-11-27
Payer: MEDICARE

## 2023-11-27 DIAGNOSIS — Z98.891 HISTORY OF UTERINE SCAR FROM PREVIOUS SURGERY: Chronic | ICD-10-CM

## 2023-11-27 DIAGNOSIS — Z00.8 ENCOUNTER FOR OTHER GENERAL EXAMINATION: ICD-10-CM

## 2023-11-27 DIAGNOSIS — K51.919 ULCERATIVE COLITIS, UNSPECIFIED WITH UNSPECIFIED COMPLICATIONS: ICD-10-CM

## 2023-11-27 DIAGNOSIS — Z98.82 BREAST IMPLANT STATUS: Chronic | ICD-10-CM

## 2023-11-27 PROCEDURE — 74181 MRI ABDOMEN W/O CONTRAST: CPT

## 2023-11-27 PROCEDURE — 72195 MRI PELVIS W/O DYE: CPT

## 2023-11-27 PROCEDURE — 74181 MRI ABDOMEN W/O CONTRAST: CPT | Mod: 26,MH

## 2023-11-27 PROCEDURE — 72195 MRI PELVIS W/O DYE: CPT | Mod: 26,MH

## 2023-11-28 DIAGNOSIS — K51.919 ULCERATIVE COLITIS, UNSPECIFIED WITH UNSPECIFIED COMPLICATIONS: ICD-10-CM

## 2023-12-04 ENCOUNTER — APPOINTMENT (OUTPATIENT)
Dept: OBGYN | Facility: CLINIC | Age: 68
End: 2023-12-04
Payer: MEDICARE

## 2023-12-04 VITALS
BODY MASS INDEX: 26.12 KG/M2 | WEIGHT: 153 LBS | OXYGEN SATURATION: 97 % | SYSTOLIC BLOOD PRESSURE: 120 MMHG | HEART RATE: 69 BPM | TEMPERATURE: 98.1 F | DIASTOLIC BLOOD PRESSURE: 70 MMHG | HEIGHT: 64 IN

## 2023-12-04 DIAGNOSIS — Z12.39 ENCOUNTER FOR OTHER SCREENING FOR MALIGNANT NEOPLASM OF BREAST: ICD-10-CM

## 2023-12-04 DIAGNOSIS — N83.209 UNSPECIFIED OVARIAN CYST, UNSPECIFIED SIDE: ICD-10-CM

## 2023-12-04 DIAGNOSIS — Z11.51 ENCOUNTER FOR SCREENING FOR HUMAN PAPILLOMAVIRUS (HPV): ICD-10-CM

## 2023-12-04 DIAGNOSIS — Z01.419 ENCOUNTER FOR GYNECOLOGICAL EXAMINATION (GENERAL) (ROUTINE) W/OUT ABNORMAL FINDINGS: ICD-10-CM

## 2023-12-04 DIAGNOSIS — R31.9 HEMATURIA, UNSPECIFIED: ICD-10-CM

## 2023-12-04 LAB
BILIRUB UR QL STRIP: NEGATIVE
CLARITY UR: NORMAL
COLLECTION METHOD: NORMAL
GLUCOSE UR-MCNC: NEGATIVE
HCG UR QL: 0.2 EU/DL
HGB UR QL STRIP.AUTO: NORMAL
KETONES UR-MCNC: NORMAL
LEUKOCYTE ESTERASE UR QL STRIP: NEGATIVE
NITRITE UR QL STRIP: NEGATIVE
PH UR STRIP: 6.5
PROT UR STRIP-MCNC: NORMAL
SP GR UR STRIP: 1.02

## 2023-12-04 PROCEDURE — G0101: CPT

## 2023-12-04 PROCEDURE — 81003 URINALYSIS AUTO W/O SCOPE: CPT | Mod: QW

## 2023-12-05 LAB — CYTOLOGY CVX/VAG DOC THIN PREP: NORMAL

## 2023-12-06 LAB — HPV HIGH+LOW RISK DNA PNL CVX: NOT DETECTED

## 2023-12-08 LAB — CYTOLOGY CVX/VAG DOC THIN PREP: ABNORMAL

## 2024-01-19 ENCOUNTER — OUTPATIENT (OUTPATIENT)
Dept: OUTPATIENT SERVICES | Facility: HOSPITAL | Age: 69
LOS: 1 days | End: 2024-01-19
Payer: MEDICARE

## 2024-01-19 DIAGNOSIS — Z98.891 HISTORY OF UTERINE SCAR FROM PREVIOUS SURGERY: Chronic | ICD-10-CM

## 2024-01-19 DIAGNOSIS — Z12.31 ENCOUNTER FOR SCREENING MAMMOGRAM FOR MALIGNANT NEOPLASM OF BREAST: ICD-10-CM

## 2024-01-19 DIAGNOSIS — N63.20 UNSPECIFIED LUMP IN THE LEFT BREAST, UNSPECIFIED QUADRANT: ICD-10-CM

## 2024-01-19 DIAGNOSIS — Z98.82 BREAST IMPLANT STATUS: Chronic | ICD-10-CM

## 2024-01-19 DIAGNOSIS — N64.59 OTHER SIGNS AND SYMPTOMS IN BREAST: ICD-10-CM

## 2024-01-19 PROCEDURE — G0279: CPT

## 2024-01-19 PROCEDURE — 76642 ULTRASOUND BREAST LIMITED: CPT | Mod: LT

## 2024-01-19 PROCEDURE — 77066 DX MAMMO INCL CAD BI: CPT | Mod: 26

## 2024-01-19 PROCEDURE — G0279: CPT | Mod: 26

## 2024-01-19 PROCEDURE — 76642 ULTRASOUND BREAST LIMITED: CPT | Mod: 26,LT

## 2024-01-19 PROCEDURE — 77066 DX MAMMO INCL CAD BI: CPT

## 2024-01-20 DIAGNOSIS — N64.59 OTHER SIGNS AND SYMPTOMS IN BREAST: ICD-10-CM

## 2024-01-20 DIAGNOSIS — N63.20 UNSPECIFIED LUMP IN THE LEFT BREAST, UNSPECIFIED QUADRANT: ICD-10-CM

## 2024-03-18 NOTE — ED ADULT NURSE NOTE - NSFALLRSKOUTCOME_ED_ALL_ED
You are due for mammogram.  Please call the following number to make appointment :  [phone]  It is located in [address]    You had nola placed on 1/4/2022    Schedule colonoscopy at your earliest convenience by calling the following number:  Joleen Mackey :[phone].  Dr.Sharon Shore and     Follow up in 6 months.  Seek sooner medical attention if there is any worsening of symptoms or problems.    Preventive Care Advice   This is general advice given by our system to help you stay healthy. However, your care team may have specific advice just for you. Please talk to your care team about your preventive care needs.  Nutrition  Eat 5 or more servings of fruits and vegetables each day.  Try wheat bread, brown rice and whole grain pasta (instead of white bread, rice, and pasta).  Get enough calcium and vitamin D. Check the label on foods and aim for 100% of the RDA (recommended daily allowance).  Lifestyle  Exercise at least 150 minutes each week   (30 minutes a day, 5 days a week).  Do muscle strengthening activities 2 days a week. These help control your weight and prevent disease.  No smoking.  Wear sunscreen to prevent skin cancer.  Have a dental exam and cleaning every 6 months.  Yearly exams  See your health care team every year to talk about:  Any changes in your health.  Any medicines your care team has prescribed.  Preventive care, family planning, and ways to prevent chronic diseases.  Shots (vaccines)   HPV shots (up to age 26), if you've never had them before.  Hepatitis B shots (up to age 59), if you've never had them before.  COVID-19 shot: Get this shot when it's due.  Flu shot: Get a flu shot every year.  Tetanus shot: Get a tetanus shot every 10 years.  Pneumococcal, hepatitis A, and RSV shots: Ask your care team if you need these based on your risk.  Shingles shot (for age 50 and up).  General health tests  Diabetes screening:  Starting at age 35, Get screened for diabetes at  least every 3 years.  If you are younger than age 35, ask your care team if you should be screened for diabetes.  Cholesterol test: At age 39, start having a cholesterol test every 5 years, or more often if advised.  Bone density scan (DEXA): At age 50, ask your care team if you should have this scan for osteoporosis (brittle bones).  Hepatitis C: Get tested at least once in your life.  STIs (sexually transmitted infections)  Before age 24: Ask your care team if you should be screened for STIs.  After age 24: Get screened for STIs if you're at risk. You are at risk for STIs (including HIV) if:  You are sexually active with more than one person.  You don't use condoms every time.  You or a partner was diagnosed with a sexually transmitted infection.  If you are at risk for HIV, ask about PrEP medicine to prevent HIV.  Get tested for HIV at least once in your life, whether you are at risk for HIV or not.  Cancer screening tests  Cervical cancer screening: If you have a cervix, begin getting regular cervical cancer screening tests at age 21. Most people who have regular screenings with normal results can stop after age 65. Talk about this with your provider.  Breast cancer scan (mammogram): If you've ever had breasts, begin having regular mammograms starting at age 40. This is a scan to check for breast cancer.  Colon cancer screening: It is important to start screening for colon cancer at age 45.  Have a colonoscopy test every 10 years (or more often if you're at risk) Or, ask your provider about stool tests like a FIT test every year or Cologuard test every 3 years.  To learn more about your testing options, visit: https://www.[employer]/595089.pdf.  For help making a decision, visit: https://bit.ly/ti60040.  Prostate cancer screening test: If you have a prostate and are age 55 to 69, ask your provider if you would benefit from a yearly prostate cancer screening test.  Lung cancer screening: If you are a current or  former smoker age 50 to 80, ask your care team if ongoing lung cancer screenings are right for you.  For informational purposes only. Not to replace the advice of your health care provider. Copyright   2023 [facility]. All rights reserved. Clinically reviewed by the [facility]. [employer] 108558 - REV 01/24.    Learning About Stress  What is stress?     Stress is your body's response to a hard situation. Your body can have a physical, emotional, or mental response. Stress is a fact of life for most people, and it affects everyone differently. What causes stress for you may not be stressful for someone else.  A lot of things can cause stress. You may feel stress when you go on a job interview, take a test, or run a race. This kind of short-term stress is normal and even useful. It can help you if you need to work hard or react quickly. For example, stress can help you finish an important job on time.  Long-term stress is caused by ongoing stressful situations or events. Examples of long-term stress include long-term health problems, ongoing problems at work, or conflicts in your family. Long-term stress can harm your health.  How does stress affect your health?  When you are stressed, your body responds as though you are in danger. It makes hormones that speed up your heart, make you breathe faster, and give you a burst of energy. This is called the fight-or-flight stress response. If the stress is over quickly, your body goes back to normal and no harm is done.  But if stress happens too often or lasts too long, it can have bad effects. Long-term stress can make you more likely to get sick, and it can make symptoms of some diseases worse. If you tense up when you are stressed, you may develop neck, shoulder, or low back pain. Stress is linked to high blood pressure and heart disease.  Stress also harms your emotional health. It can make you paige, tense, or depressed. Your  relationships may suffer, and you may not do well at work or school.  What can you do to manage stress?  You can try these things to help manage stress:   Do something active. Exercise or activity can help reduce stress. Walking is a great way to get started. Even everyday activities such as housecleaning or yard work can help.  Try yoga or milady chi. These techniques combine exercise and meditation. You may need some training at first to learn them.  Do something you enjoy. For example, listen to music or go to a movie. Practice your hobby or do volunteer work.  Meditate. This can help you relax, because you are not worrying about what happened before or what may happen in the future.  Do guided imagery. Imagine yourself in any setting that helps you feel calm. You can use online videos, books, or a teacher to guide you.  Do breathing exercises. For example:  From a standing position, bend forward from the waist with your knees slightly bent. Let your arms dangle close to the floor.  Breathe in slowly and deeply as you return to a standing position. Roll up slowly and lift your head last.  Hold your breath for just a few seconds in the standing position.  Breathe out slowly and bend forward from the waist.  Let your feelings out. Talk, laugh, cry, and express anger when you need to. Talking with supportive friends or family, a counselor, or a lamar leader about your feelings is a healthy way to relieve stress. Avoid discussing your feelings with people who make you feel worse.  Write. It may help to write about things that are bothering you. This helps you find out how much stress you feel and what is causing it. When you know this, you can find better ways to cope.  What can you do to prevent stress?  You might try some of these things to help prevent stress:  Manage your time. This helps you find time to do the things you want and need to do.  Get enough sleep. Your body recovers from the stresses of the day while  "you are sleeping.  Get support. Your family, friends, and community can make a difference in how you experience stress.  Limit your news feed. Avoid or limit time on social media or news that may make you feel stressed.  Do something active. Exercise or activity can help reduce stress. Walking is a great way to get started.  Where can you learn more?  Go to https://www.[employer].net/patiented  Enter N032 in the search box to learn more about \"Learning About Stress.\"  Current as of: October 24, 2023               Content Version: 14.0    2330-5851 [employer].   Care instructions adapted under license by your healthcare professional. If you have questions about a medical condition or this instruction, always ask your healthcare professional. [employer] disclaims any warranty or liability for your use of this information.      " [employer]

## 2024-08-20 NOTE — ED ADULT TRIAGE NOTE - ARRIVAL FROM
Home
Is This A New Presentation, Or A Follow-Up?: Skin Lesion
How Severe Is Your Skin Lesion?: moderate
Has Your Skin Lesion Been Treated?: not been treated

## 2024-10-06 ENCOUNTER — EMERGENCY (EMERGENCY)
Facility: HOSPITAL | Age: 69
LOS: 0 days | Discharge: HOME | End: 2024-10-06
Attending: EMERGENCY MEDICINE
Payer: MEDICARE

## 2024-10-06 VITALS
HEIGHT: 64 IN | RESPIRATION RATE: 20 BRPM | DIASTOLIC BLOOD PRESSURE: 100 MMHG | SYSTOLIC BLOOD PRESSURE: 166 MMHG | WEIGHT: 139.99 LBS | HEART RATE: 100 BPM | TEMPERATURE: 98 F | OXYGEN SATURATION: 100 %

## 2024-10-06 VITALS
DIASTOLIC BLOOD PRESSURE: 84 MMHG | OXYGEN SATURATION: 100 % | SYSTOLIC BLOOD PRESSURE: 175 MMHG | RESPIRATION RATE: 19 BRPM | HEART RATE: 81 BPM | TEMPERATURE: 99 F

## 2024-10-06 DIAGNOSIS — R07.89 OTHER CHEST PAIN: ICD-10-CM

## 2024-10-06 DIAGNOSIS — Z87.891 PERSONAL HISTORY OF NICOTINE DEPENDENCE: ICD-10-CM

## 2024-10-06 DIAGNOSIS — F41.0 PANIC DISORDER [EPISODIC PAROXYSMAL ANXIETY]: ICD-10-CM

## 2024-10-06 DIAGNOSIS — F32.A DEPRESSION, UNSPECIFIED: ICD-10-CM

## 2024-10-06 DIAGNOSIS — E78.5 HYPERLIPIDEMIA, UNSPECIFIED: ICD-10-CM

## 2024-10-06 DIAGNOSIS — Z88.0 ALLERGY STATUS TO PENICILLIN: ICD-10-CM

## 2024-10-06 DIAGNOSIS — Z98.891 HISTORY OF UTERINE SCAR FROM PREVIOUS SURGERY: Chronic | ICD-10-CM

## 2024-10-06 DIAGNOSIS — R06.02 SHORTNESS OF BREATH: ICD-10-CM

## 2024-10-06 DIAGNOSIS — I10 ESSENTIAL (PRIMARY) HYPERTENSION: ICD-10-CM

## 2024-10-06 DIAGNOSIS — Z98.82 BREAST IMPLANT STATUS: Chronic | ICD-10-CM

## 2024-10-06 DIAGNOSIS — R11.2 NAUSEA WITH VOMITING, UNSPECIFIED: ICD-10-CM

## 2024-10-06 LAB
ALBUMIN SERPL ELPH-MCNC: 5.1 G/DL — SIGNIFICANT CHANGE UP (ref 3.5–5.2)
ALP SERPL-CCNC: 77 U/L — SIGNIFICANT CHANGE UP (ref 30–115)
ALT FLD-CCNC: 17 U/L — SIGNIFICANT CHANGE UP (ref 0–41)
ANION GAP SERPL CALC-SCNC: 13 MMOL/L — SIGNIFICANT CHANGE UP (ref 7–14)
APPEARANCE UR: ABNORMAL
AST SERPL-CCNC: 19 U/L — SIGNIFICANT CHANGE UP (ref 0–41)
BACTERIA # UR AUTO: NEGATIVE /HPF — SIGNIFICANT CHANGE UP
BASOPHILS # BLD AUTO: 0.03 K/UL — SIGNIFICANT CHANGE UP (ref 0–0.2)
BASOPHILS NFR BLD AUTO: 0.5 % — SIGNIFICANT CHANGE UP (ref 0–1)
BILIRUB SERPL-MCNC: 0.4 MG/DL — SIGNIFICANT CHANGE UP (ref 0.2–1.2)
BILIRUB UR-MCNC: NEGATIVE — SIGNIFICANT CHANGE UP
BUN SERPL-MCNC: 12 MG/DL — SIGNIFICANT CHANGE UP (ref 10–20)
CALCIUM SERPL-MCNC: 10 MG/DL — SIGNIFICANT CHANGE UP (ref 8.4–10.5)
CAST: 11 /LPF — HIGH (ref 0–4)
CHLORIDE SERPL-SCNC: 104 MMOL/L — SIGNIFICANT CHANGE UP (ref 98–110)
CO2 SERPL-SCNC: 23 MMOL/L — SIGNIFICANT CHANGE UP (ref 17–32)
COLOR SPEC: YELLOW — SIGNIFICANT CHANGE UP
CREAT SERPL-MCNC: 0.8 MG/DL — SIGNIFICANT CHANGE UP (ref 0.7–1.5)
DIFF PNL FLD: ABNORMAL
EGFR: 81 ML/MIN/1.73M2 — SIGNIFICANT CHANGE UP
EOSINOPHIL # BLD AUTO: 0.03 K/UL — SIGNIFICANT CHANGE UP (ref 0–0.7)
EOSINOPHIL NFR BLD AUTO: 0.5 % — SIGNIFICANT CHANGE UP (ref 0–8)
GLUCOSE SERPL-MCNC: 98 MG/DL — SIGNIFICANT CHANGE UP (ref 70–99)
GLUCOSE UR QL: NEGATIVE MG/DL — SIGNIFICANT CHANGE UP
HCT VFR BLD CALC: 38.9 % — SIGNIFICANT CHANGE UP (ref 37–47)
HGB BLD-MCNC: 13.6 G/DL — SIGNIFICANT CHANGE UP (ref 12–16)
IMM GRANULOCYTES NFR BLD AUTO: 0.2 % — SIGNIFICANT CHANGE UP (ref 0.1–0.3)
KETONES UR-MCNC: ABNORMAL MG/DL
LEUKOCYTE ESTERASE UR-ACNC: NEGATIVE — SIGNIFICANT CHANGE UP
LIDOCAIN IGE QN: 550 U/L — HIGH (ref 7–60)
LYMPHOCYTES # BLD AUTO: 1.4 K/UL — SIGNIFICANT CHANGE UP (ref 1.2–3.4)
LYMPHOCYTES # BLD AUTO: 24.3 % — SIGNIFICANT CHANGE UP (ref 20.5–51.1)
MCHC RBC-ENTMCNC: 31.7 PG — HIGH (ref 27–31)
MCHC RBC-ENTMCNC: 35 G/DL — SIGNIFICANT CHANGE UP (ref 32–37)
MCV RBC AUTO: 90.7 FL — SIGNIFICANT CHANGE UP (ref 81–99)
MONOCYTES # BLD AUTO: 0.43 K/UL — SIGNIFICANT CHANGE UP (ref 0.1–0.6)
MONOCYTES NFR BLD AUTO: 7.5 % — SIGNIFICANT CHANGE UP (ref 1.7–9.3)
NEUTROPHILS # BLD AUTO: 3.86 K/UL — SIGNIFICANT CHANGE UP (ref 1.4–6.5)
NEUTROPHILS NFR BLD AUTO: 67 % — SIGNIFICANT CHANGE UP (ref 42.2–75.2)
NITRITE UR-MCNC: NEGATIVE — SIGNIFICANT CHANGE UP
NRBC # BLD: 0 /100 WBCS — SIGNIFICANT CHANGE UP (ref 0–0)
PH UR: 6 — SIGNIFICANT CHANGE UP (ref 5–8)
PLATELET # BLD AUTO: 215 K/UL — SIGNIFICANT CHANGE UP (ref 130–400)
PMV BLD: 10.5 FL — HIGH (ref 7.4–10.4)
POTASSIUM SERPL-MCNC: 3.9 MMOL/L — SIGNIFICANT CHANGE UP (ref 3.5–5)
POTASSIUM SERPL-SCNC: 3.9 MMOL/L — SIGNIFICANT CHANGE UP (ref 3.5–5)
PROT SERPL-MCNC: 7.3 G/DL — SIGNIFICANT CHANGE UP (ref 6–8)
PROT UR-MCNC: 30 MG/DL
RBC # BLD: 4.29 M/UL — SIGNIFICANT CHANGE UP (ref 4.2–5.4)
RBC # FLD: 12.4 % — SIGNIFICANT CHANGE UP (ref 11.5–14.5)
RBC CASTS # UR COMP ASSIST: 1 /HPF — SIGNIFICANT CHANGE UP (ref 0–4)
SODIUM SERPL-SCNC: 140 MMOL/L — SIGNIFICANT CHANGE UP (ref 135–146)
SP GR SPEC: 1.02 — SIGNIFICANT CHANGE UP (ref 1–1.03)
SQUAMOUS # UR AUTO: 4 /HPF — SIGNIFICANT CHANGE UP (ref 0–5)
UROBILINOGEN FLD QL: 1 MG/DL — SIGNIFICANT CHANGE UP (ref 0.2–1)
WBC # BLD: 5.76 K/UL — SIGNIFICANT CHANGE UP (ref 4.8–10.8)
WBC # FLD AUTO: 5.76 K/UL — SIGNIFICANT CHANGE UP (ref 4.8–10.8)
WBC UR QL: 2 /HPF — SIGNIFICANT CHANGE UP (ref 0–5)

## 2024-10-06 PROCEDURE — 83690 ASSAY OF LIPASE: CPT

## 2024-10-06 PROCEDURE — 82962 GLUCOSE BLOOD TEST: CPT

## 2024-10-06 PROCEDURE — 99285 EMERGENCY DEPT VISIT HI MDM: CPT

## 2024-10-06 PROCEDURE — 74177 CT ABD & PELVIS W/CONTRAST: CPT | Mod: 26,MC

## 2024-10-06 PROCEDURE — 36415 COLL VENOUS BLD VENIPUNCTURE: CPT

## 2024-10-06 PROCEDURE — 93005 ELECTROCARDIOGRAM TRACING: CPT

## 2024-10-06 PROCEDURE — 93010 ELECTROCARDIOGRAM REPORT: CPT

## 2024-10-06 PROCEDURE — 99285 EMERGENCY DEPT VISIT HI MDM: CPT | Mod: 25

## 2024-10-06 PROCEDURE — 81001 URINALYSIS AUTO W/SCOPE: CPT

## 2024-10-06 PROCEDURE — 36000 PLACE NEEDLE IN VEIN: CPT | Mod: XU

## 2024-10-06 PROCEDURE — 80053 COMPREHEN METABOLIC PANEL: CPT

## 2024-10-06 PROCEDURE — 85025 COMPLETE CBC W/AUTO DIFF WBC: CPT

## 2024-10-06 PROCEDURE — 74177 CT ABD & PELVIS W/CONTRAST: CPT | Mod: MC

## 2024-10-06 RX ORDER — SODIUM CHLORIDE 0.9 % (FLUSH) 0.9 %
1000 SYRINGE (ML) INJECTION ONCE
Refills: 0 | Status: COMPLETED | OUTPATIENT
Start: 2024-10-06 | End: 2024-10-06

## 2024-10-06 RX ORDER — ONDANSETRON HCL/PF 4 MG/2 ML
4 VIAL (ML) INJECTION ONCE
Refills: 0 | Status: COMPLETED | OUTPATIENT
Start: 2024-10-06 | End: 2024-10-06

## 2024-10-06 RX ORDER — ACETAMINOPHEN 325 MG
975 TABLET ORAL ONCE
Refills: 0 | Status: COMPLETED | OUTPATIENT
Start: 2024-10-06 | End: 2024-10-06

## 2024-10-06 RX ORDER — HYDROXYZINE PAMOATE 50 MG
25 CAPSULE ORAL ONCE
Refills: 0 | Status: COMPLETED | OUTPATIENT
Start: 2024-10-06 | End: 2024-10-06

## 2024-10-06 RX ADMIN — Medication 25 MILLIGRAM(S): at 18:40

## 2024-10-06 RX ADMIN — Medication 975 MILLIGRAM(S): at 13:46

## 2024-10-06 RX ADMIN — Medication 1000 MILLILITER(S): at 10:57

## 2024-10-06 NOTE — ED PROVIDER NOTE - PHYSICAL EXAMINATION
GENERAL: Well-developed; well-nourished; in no acute distress.   SKIN: warm, dry  HEAD: Normocephalic; atraumatic.  EYES: PERRLA, EOMI, no conjunctival erythema  ENT: No nasal discharge; airway clear.  NECK: Supple; non tender.  CARD: Regular rate and rhythm. S1, S2 normal; no murmurs, gallops, or rubs.   RESP: LCTAB; No wheezes, rales, rhonchi, or stridor.  ABD: soft, nontender, nondistended  EXT: Normal ROM.  No LE TTP or edema bilaterally.  NEURO: A/ox3, grossly unremarkable  PSYCH: Tearful, anxious

## 2024-10-06 NOTE — ED PROVIDER NOTE - PATIENT PORTAL LINK FT
You can access the FollowMyHealth Patient Portal offered by [facility] by registering at the following website: [email or web address]. By joining [employer]’s FollowMyHealth portal, you will also be able to view your health information using other applications (apps) compatible with our system. You can access the FollowMyHealth Patient Portal offered by [facility] by registering at the following website: [email or web address]. By joining [employer]’s FollowMyHealth portal, you will also be able to view your health information using other applications (apps) compatible with our system.

## 2024-10-06 NOTE — ED PROVIDER NOTE - CLINICAL SUMMARY MEDICAL DECISION MAKING FREE TEXT BOX
69-year-old female with past medical history of epigastric ulcer disease, hypertension, hyperlipidemia, and depression/anxiety, comes in complaining of intermittent anxiety, nausea, diarrhea, for the last 2 weeks.  Patient states her sertraline formulation changed to generic and that is when symptoms started.  Had similar symptoms last time meds were changed.  Patient states dose is the same.  No fever/chills, chest pain/shortness of breath.  Reports urinary frequency.  On exam, pt in NAD, AAOx3, head NC/AT, CN II-XII intact, lungs CTA B/L, CV S1S2 regular, abdomen soft/NT/ND/(+)BS, ext (-) edema, motor 5/5x4, sensation intact. Labs done. Lipase noted. CT ordered/done/reviewed. Pt is tolerating PO. Will d/c with GI/PMD follow up and strict return precautions.

## 2024-10-06 NOTE — ED PROVIDER NOTE - ATTENDING CONTRIBUTION TO CARE
69-year-old female with past medical history of epigastric ulcer disease, hypertension, hyperlipidemia, and depression/anxiety, comes in complaining of intermittent anxiety, nausea, diarrhea, for the last 2 weeks.  Patient states her sertraline formulation changed to generic and that is when symptoms started.  Had similar symptoms last time meds were changed.  Patient states dose is the same.  No fever/chills, chest pain/shortness of breath.  Reports urinary frequency.  On exam, pt in NAD, AAOx3, head NC/AT, CN II-XII intact, lungs CTA B/L, CV S1S2 regular, abdomen soft/NT/ND/(+)BS, ext (-) edema, motor 5/5x4, sensation intact.  Will do labs, hydrate patient, and reevaluate.

## 2024-10-06 NOTE — ED PROVIDER NOTE - NSFOLLOWUPINSTRUCTIONS_ED_ALL_ED_FT
Our Emergency Department Referral Coordinators will be reaching out to you in the next 24-48 hours from 9:00am to 5:00pm to schedule a follow up appointment. Please expect a phone call from the hospital in that time frame. If you do not receive a call or if you have any questions or concerns, you can reach them at   [phone]    FOLLOW UP WITH YOUR DOCTOR THIS WEEK FOR REEVALUATION.     RETURN TO ED IMMEDIATELY WITH ANY WORSENING SYMPTOMS, CHEST PAIN, SHORTNESS OF BREATH, ABDOMINAL PAIN, FEVERS, WEAKNESS, DIZZINESS, PERSISTENT OR SEVERE HEADACHE OR ANY OTHER CONCERNS.    Be sure to follow a low fat diet. It is important to keep your cholesterol well controlled to help prevent an episode. Return to the hospital if you experience recurrent or worsening abdominal pain associated with back pain, nausea, vomiting, inability to eat. If this is not your first time having Pancreatitis, or if you had a severe case, you may benefit from having a follow up appointment with a Gastroenterologist.

## 2024-10-06 NOTE — ED ADULT NURSE NOTE - CHIEF COMPLAINT QUOTE
"I have diarrhea and I've been vomiting for about a week now. Every time my Citalopram is changed into a different generic, I get sick like this." Pt also c/o intermittent chest pain and shortness of breath, poor appetite. Triage [id number]=[id number].

## 2024-10-06 NOTE — ED ADULT TRIAGE NOTE - CHIEF COMPLAINT QUOTE
"I have diarrhea and I've been vomiting for about a week now. Every time my Citalopram is changed into a different generic, I get sick like this." "I have diarrhea and I've been vomiting for about a week now. Every time my Citalopram is changed into a different generic, I get sick like this." Pt also c/o intermittent chest pain and shortness of breath, poor appetite. Triage WO=374.

## 2024-10-06 NOTE — ED PROVIDER NOTE - OBJECTIVE STATEMENT
69-year-old female, with past medical history of HTN and HLD, presents to the emergency department for nausea, vomiting, diarrhea for the past 8 days or so.  Patient's  reports she was switched to generic sertraline 2 weeks ago and something similar happened years ago.  Patient also complains of "panic attacks" described as shortness of breath and chest tightness.  Denies fever, sick contacts, recent antibiotic.

## 2024-12-16 NOTE — ED ADULT NURSE NOTE - NS ED NURSE DISCH DISPOSITION
[facility]      Name:  Aamir Rodriguez   Age:  56 y.o.  Sex:  male  :  1968  MRN:  [id number]   Visit Number:  [id number]  Admission Date:  12/15/2024  Date Of Service:  12/15/24  Primary Care Physician:  Juan Ramon Bianchi MD    Chief Complaint:     Chest pain    History Of Presenting Illness:      Patient is a 56-year-old male with history significant for kidney stones, pancreatitis, and migraine with strokelike symptoms who presents to the emergency room with acute onset left-sided chest pain.  Patient reports that pain radiated into the left side of neck and into the left shoulder.  Patient was sitting on the couch watching the [employer] football game when pain started.  Has never experienced this before.  Describes it as a pressure/throbbing sensation with radiation to left neck and left shoulder.  Chest pain has improved since arrival to the ER but pain is still present in left shoulder.  Patient denies associated shortness of breath, nausea or vomiting.  Denies recent illness.  Lifelong non-smoker.  Denies alcohol and illicit drug use.    ED summary: Patient afebrile, normotensive, nonhypoxic on room air.  Troponins negative x 2.  CMP largely unremarkable except for glucose 132.  CBC unremarkable.  Chest x-ray personally reviewed, no acute process.  CT of the chest negative for PE.  No consolidation effusion or pneumothorax.  CT abdomen pelvis shows no abdominal aorta dissection.  Mid abdomen mesenteric edema and adenopathy may reflect mesenteric panniculitis.  Patient with continued left-sided chest pain.  On-call cardiologist, Dr. Herrmann contacted by ER.  Recommended admission.  Patient received aspirin therapy and hospitalist asked to admit.    Review Of Systems:    All systems were reviewed and negative except as mentioned in history of presenting illness, assessment and plan.    Past Medical History: Patient  has a past medical history of  Arthritis, GERD (gastroesophageal reflux disease), Kidney stones, Pancreatitis (04/2016), and Stroke.    Past Surgical History: Patient  has a past surgical history that includes Shoulder arthroscopy (Bilateral); Knee arthroscopy (Bilateral); Replacement total knee bilateral; Vasectomy; Reconstruction of nose; Temporomandibular joint surgery; and Tonsillectomy and adenoidectomy.    Social History: Patient  reports that he has never smoked. He quit smokeless tobacco use about 18 years ago. He reports that he does not drink alcohol and does not use drugs.    Family History:  Patient's family history has been reviewed and found to be noncontributory.     Allergies:      Morphine and codeine, Oxycontin [oxycodone hcl], and Percocet [oxycodone-acetaminophen]    Home Medications:    Prior to Admission Medications       Prescriptions Last Dose Informant Patient Reported? Taking?    acetaminophen (TYLENOL) 500 MG tablet   No No    Take 1 tablet by mouth Every 6 (Six) Hours As Needed for Mild Pain.    aspirin 81 MG EC tablet   Yes No    Take 81 mg by mouth Daily.    atorvastatin (LIPITOR) 20 MG tablet   Yes No    Take 20 mg by mouth Daily.    cetirizine (zyrTEC) 10 MG tablet   Yes No    Take 10 mg by mouth Daily As Needed for Allergies.    cyclobenzaprine (FLEXERIL) 10 MG tablet   Yes No    Take 10 mg by mouth 2 (Two) Times a Day As Needed for Muscle Spasms.    ibuprofen (ADVIL,MOTRIN) 600 MG tablet   No No    Take 1 tablet by mouth Every 6 (Six) Hours As Needed for Mild Pain or Moderate Pain.    Multiple Vitamins-Minerals (MULTIVITAMIN ADULT PO)   Yes No    Take 1 tablet by mouth Daily.    omeprazole (priLOSEC) 20 MG capsule   Yes No    Take 20 mg by mouth Daily.    ondansetron ODT (ZOFRAN-ODT) 4 MG disintegrating tablet   No No    Take 1 tablet by mouth 4 (Four) Times a Day As Needed for Nausea or Vomiting.    oxyCODONE (Roxicodone) 5 MG immediate release tablet   No No    Take 1 tablet by mouth Every 4 (Four) Hours As  "Needed for Moderate Pain or Severe Pain.    tamsulosin (FLOMAX) 0.4 MG capsule 24 hr capsule   No No    Take 1 capsule by mouth Every Night.          ED Medications:    Medications   sodium chloride 0.9 % flush 10 mL (has no administration in time range)   losartan (COZAAR) tablet 50 mg (has no administration in time range)   atorvastatin (LIPITOR) tablet 80 mg (has no administration in time range)   Pharmacy to Dose enoxaparin (LOVENOX) (has no administration in time range)   aspirin tablet 325 mg (325 mg Oral Given 12/15/24 1628)   acetaminophen (TYLENOL) tablet 975 mg (975 mg Oral Given 12/15/24 1650)   ketorolac (TORADOL) injection 30 mg (30 mg Intravenous Given 12/15/24 1650)   iopamidol (ISOVUE-300) 61 % injection 100 mL (100 mL Intravenous Given 12/15/24 1840)   diphenhydrAMINE (BENADRYL) injection 25 mg (25 mg Intravenous Given 12/15/24 1933)     Vital Signs:  Temp:  [97.5 °F (36.4 °C)] 97.5 °F (36.4 °C)  Heart Rate:  [59-70] 59  Resp:  [13-20] 14  BP: (122-148)/(83-93) 143/92        12/15/24  1603   Weight: 107 kg (235 lb)     Body mass index is 34.7 kg/m².    Physical Exam:     Most recent vital Signs: /92   Pulse 59   Temp 97.5 °F (36.4 °C) (Oral)   Resp 14   Ht 175.3 cm (69\")   Wt 107 kg (235 lb)   SpO2 94%   BMI 34.70 kg/m²     Physical Exam  Vitals reviewed.   Constitutional:       General: He is not in acute distress.     Appearance: He is obese.   HENT:      Head: Normocephalic and atraumatic.      Right Ear: External ear normal.      Left Ear: External ear normal.      Mouth/Throat:      Mouth: Mucous membranes are moist.      Pharynx: Oropharynx is clear.   Eyes:      Extraocular Movements: Extraocular movements intact.      Conjunctiva/sclera: Conjunctivae normal.   Cardiovascular:      Rate and Rhythm: Normal rate and regular rhythm.      Pulses: Normal pulses.      Heart sounds: Normal heart sounds.   Pulmonary:      Effort: Pulmonary effort is normal.      Breath sounds: Normal " "breath sounds.   Abdominal:      General: Bowel sounds are normal.      Palpations: Abdomen is soft.   Musculoskeletal:         General: Normal range of motion.      Right lower leg: No edema.      Left lower leg: No edema.   Skin:     General: Skin is warm and dry.   Neurological:      General: No focal deficit present.      Mental Status: He is alert and oriented to person, place, and time.         Laboratory data:    I have reviewed the labs done in the emergency room.    Results from last 7 days   Lab Units 12/15/24  1651   SODIUM mmol/L 141   POTASSIUM mmol/L 4.4   CHLORIDE mmol/L 102   CO2 mmol/L 26.1   BUN mg/dL 12   CREATININE mg/dL 1.19   CALCIUM mg/dL 9.3   BILIRUBIN mg/dL 0.3   ALK PHOS U/L 86   ALT (SGPT) U/L 64*   AST (SGOT) U/L 39   GLUCOSE mg/dL 132*     Results from last 7 days   Lab Units 12/15/24  1651   WBC 10*3/mm3 7.60   HEMOGLOBIN g/dL 15.1   HEMATOCRIT % 44.3   PLATELETS 10*3/mm3 258         Results from last 7 days   Lab Units 12/15/24  1752 12/15/24  1651   HSTROP T ng/L <6 <6             Results from last 7 days   Lab Units 12/15/24  1752   LIPASE U/L 21               Invalid input(s): \"USDES\", \"NITRITITE\", \"BACT\", \"EP\"    Pain Management Panel           No data to display                EKG:      EKG, sinus rhythm with rate of 68 bpm.  Low voltage.    Radiology:    CT Angiogram Chest Pulmonary Embolism    Result Date: 12/15/2024  FINAL REPORT TECHNIQUE: null CLINICAL HISTORY: Rule out PE COMPARISON: null FINDINGS: CT angiogram of chest with MIP postprocessing: Comparison: None FINDINGS: Pulmonary arteries: Pulmonary arteries are well-opacified, free of intraluminal thrombus. Thoracic aorta: Normal caliber without dissection. Mediastinum/heart: Heart size is normal. Normal RV/LV ratio. No pericardial effusion. No enlarged mediastinal, hilar, or axillary lymph nodes. Trachea and mainstem bronchi of normal caliber without intraluminal lesion. Visualized thyroid gland without mass or " enlargement. Esophagus without significant distention or evidence of mass. Lungs/pleura: Lungs are well expanded, free of infiltrates, soft tissue nodule, or chronic interstitial change. No pleural effusion or pneumothorax. Uppermost abdomen: No adrenal mass. Contracted gallbladder without radiopaque/radiolucent gallstones. Bone/MSK: Moderate spondylosis of the thoracic spine. No acute fracture involving the visualized bony thorax. No destructive osseous lesion.     IMPRESSION: 1. Negative for acute pulmonary artery embolism. Thoracic aorta of normal caliber without dissection. 2. No consolidation, pleural effusion, or pneumothorax. 3. Other findings above. Authenticated and Electronically Signed by Jack Lane MD on 12/15/2024 07:57:09 PM    CT Abdomen Pelvis With Contrast    Result Date: 12/15/2024  FINAL REPORT TECHNIQUE: null CLINICAL HISTORY: Rule out dissection COMPARISON: null FINDINGS: CT abdomen and pelvis with IV contrast. Comparison: None FINDINGS: Lung bases: No soft tissue nodule, infiltrate, or pleural effusion. Upper Abdomen: The liver, spleen, and pancreas are within normal limits. Gallbladder is contracted without intraluminal radiopaque/radiolucent calculus. No biliary dilatation. Retroperitoneum/Pelvis: No adrenal mass. Abdominal aorta is of normal caliber without significant calcific atheromatous change. No abdominal aortic dissection. Retroaortic left renal vein without significant stenosis. No pathologic para-aortic adenopathy. Normal-sized kidneys. Hyperdense renal pyramids bilaterally may reflect microcalcifications. No renal mass, hydronephrosis or nephrolithiasis. Nondilated ureters without calculus. Normal-appearing urinary bladder. Bowel/Mesentery: No colonic obstruction or inflammation, or diverticular disease. Normal appendix positioned lateral to the inferior margin of the right lobe of the liver. Small bowel without dilatation or mural thickening. Midabdomen mesenteric edema and  mild adenopathy. Mildly distended stomach without focal lesion. The superior mesenteric artery and vein are patent. Bone/Extraperitoneal Soft Tissues: Mild spondylosis and scoliosis of the lumbar spine. Unremarkable bony pelvis. Extraperitoneal soft tissues of the abdomen and pelvis are unremarkable.     IMPRESSION: 1. Normal caliber abdominal aorta without dissection. Patent mesenteric branches. 2. Midabdomen mesenteric edema and adenopathy may reflect mesenteric panniculitis. 3. Other findings above. Authenticated and Electronically Signed by Jack Lane MD on 12/15/2024 07:40:48 PM    XR Chest 1 View    Result Date: 12/15/2024  PROCEDURE: XR CHEST 1 VW-  HISTORY: Chest Pain Triage Protocol  COMPARISON: None.  FINDINGS: The heart is normal in size. The lungs are clear. The mediastinum is unremarkable. There is no pneumothorax.  There are no acute osseous abnormalities. Patient is rotated to the right. Decreased inspiratory effort noted.      No acute cardiopulmonary process.      This report was signed and finalized on 12/15/2024 4:47 PM by Vivian Morgan MD.       Assessment:    Chest Pain  GERD   Hyperlipidemia    Plan:    Chest Pain  Patient with continued left-sided chest pain in the ER   Troponin negative x 2  EKG without acute ischemic changes  Dr. Herrmann with cardiology consulted  Therapeutic Lovenox  High intensity statin  Initiate losartan for better blood pressure control  Daily aspirin  A1c and lipid panel pending    Further orders as clinical course dictate      Risk Assessment: High  DVT Prophylaxis: Therapeutic Lovenox  Code Status: Full  Diet: Cardiac             Macho Arechiga DO  12/15/24  21:15 EST    Dictated utilizing Dragon dictation.     Discharged

## 2025-02-14 NOTE — ED ADULT NURSE NOTE - AVIAN FLU SYMPTOMS
MEDICATION REFILL REQUEST      Name of Medication:  Valsartan   Dose:  160-25 mg  Frequency:  daily   Quantity:    Days' supply:  90 day       Pharmacy Name/Location:  [facility] in [place] / Please call patient if there is a problem    No

## 2025-02-18 NOTE — ED ADULT NURSE NOTE - CAS TRG GEN SKIN CONDITION
Patient ID:   Carmen Morrow is a 56 y.o. female.    Chief Complaint:   Right upper extremity pain and numbness    HPI:   The patient is a 56-year-old female who is presenting with pain and numbness primarily in her right hand.  Occurs mostly at night.  She often has to shake her hand.  She reports involvement of pretty much all of the fingers of her hand.  She reports radiation of the pain proximal to her wrist.  She denies any current neck pain. She has tried wearing a carpal tunnel brace with limited success.  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 2/8/2017    Mixed hyperlipidemia 12/29/2021    SVT (supraventricular tachycardia)         Past Surgical History:  Past Surgical History:   Procedure Laterality Date    anterior diskectomy fusion  05/20/2016    AUGMENTATION OF BREAST Bilateral     1994    CARPAL TUNNEL RELEASE Left 01/30/2018    COLONOSCOPY, SCREENING, LOW RISK PATIENT N/A 2/4/2025    Procedure: COLONOSCOPY, SCREENING, LOW RISK PATIENT;  Surgeon: Orville Rodriguez MD;  Location: [facility] ENDOSCOPY;  Service: Endoscopy;  Laterality: N/A;  ref by Bryce Tavares MD, peg, portal, GLP-1 hold 8 days-ae  1/28/25- holding GLP-1 as directed, pc complete. DBM    CORRECTION OF HAMMER TOE Right 02/02/2021    Procedure: CORRECTION, HAMMER TOE;  Surgeon: Maday Thomas DPM;  Location: [facility] OR;  Service: Podiatry;  Laterality: Right;  4th toe      CSF leak  05/22/2016    Replace and repair fat graft from surgery on  "2016    ESOPHAGOGASTRODUODENOSCOPY N/A 2020    Procedure: ESOPHAGOGASTRODUODENOSCOPY (EGD);  Surgeon: Alexi Macias MD;  [facility]  Service: Endoscopy;  Laterality: N/A;  prep ins. 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 [employer]     Employer: [employer]   Tobacco Use    Smoking status: Former     Current packs/day: 0.00     Types: Cigarettes     Quit date: 2000     Years since quittin.1     Passive exposure: Past    Smokeless tobacco: Never   Substance and Sexual Activity    Alcohol use: No    Drug use: No    Sexual activity: Yes     Partners: Male     Birth control/protection: None       Family History:  Family History   Problem Relation Name Age of Onset    Heart disease Mother      Hypertension Mother      Asthma Mother      Heart disease Father      COPD Father          smoker    Diabetes Father      Coronary artery disease Sister          MI-59,    Neuropathy Sister      Cirrhosis Brother          Hep C-    Ovarian cancer Neg Hx      Colon cancer Neg Hx      Breast cancer Neg Hx      Cancer Neg Hx          ROS:  Review of Systems   Musculoskeletal:  Positive for joint pain and myalgias.   Neurological:  Positive for numbness and paresthesias.   All other systems reviewed and are negative.      Vitals:  Ht 5' 5" (1.651 m)   Wt 63 kg (138 lb 14.2 oz)   LMP  (LMP Unknown)   BMI 23.11 kg/m²     Physical Examination:  Comprehensive Orthopaedic Musculoskeletal Exam    General      Constitutional: appears stated age, well-developed and well-nourished    Scleral icterus: " no    Labored breathing: no    Psychiatric: normal mood and affect and no acute distress    Neurological: alert and oriented x3    Skin: intact    Lymphadenopathy: none     Ortho Exam   Full elbow range of motion.    Nontender over the cubital tunnel.  Negative Tinel's over the cubital tunnel.  Positive elbow hyperflexion test.  Positive carpal tunnel compression test.  Positive Phalen's test    Imaging:  I have ordered and independently reviewed the following imaging studies performed at [facility] today    Right hand x-ray series is unremarkable except for an ossicle near the ulnar styloid.  This may represent an ossicle versus an old fracture    Assessment:  1. Right upper extremity numbness        Plan:  The patient has clinical findings consistent with both cubital tunnel and carpal tunnel syndrome.  I have advised electrodiagnostic testing.  I will contact her with the results of the study once they are completed.     Orders Placed This Encounter    EMG W/ ULTRASOUND AND NERVE CONDUCTION TEST 1 Extremity     No follow-ups on file.              [1]   Current Outpatient Medications:     acyclovir (ZOVIRAX) 200 MG capsule, Take 1 capsule (200 mg total) by mouth as needed (for oral lesions)., Disp: 10 capsule, Rfl: 5    cetirizine (ZYRTEC) 10 MG tablet, Take 10 mg by mouth as needed. 1 Tablet Oral Every day, Disp: , Rfl:     fluticasone propionate (FLONASE) 50 mcg/actuation nasal spray, 1 spray (50 mcg total) by Each Nostril route once daily., Disp: 16 g, Rfl: 3    plecanatide (TRULANCE) 3 mg Tab, Take 3 mg by mouth as needed (for severe pain associated with constipation). For severe constipation, Disp: 90 tablet, Rfl: 3    amLODIPine (NORVASC) 5 MG tablet, Take 1 tablet (5 mg total) by mouth once daily., Disp: 90 tablet, Rfl: 3    esomeprazole (NEXIUM) 40 MG capsule, Take 1 capsule (40 mg total) by mouth before breakfast., Disp: 90 capsule, Rfl: 3    metoprolol tartrate (LOPRESSOR) 100 MG tablet, If heart rate >75  bpm take metoprolol OR ivabradine: take 100 mg tablet 1 hour prior to CTA.  Bring second tab with you to the procedure., Disp: 2 tablet, Rfl: 0    metoprolol tartrate (LOPRESSOR) 50 MG tablet, If heart rate >60 bpm:  take 50 mg tablet 1 hour prior to CTA.  Bring second tab with you to the procedure., Disp: 2 tablet, Rfl: 0    traZODone (DESYREL) 50 MG tablet, TAKE 1 TABLET (50 MG TOTAL) BY MOUTH NIGHTLY AS NEEDED FOR INSOMNIA (SLEEP)., Disp: 90 tablet, Rfl: 3     Warm

## 2025-02-19 ENCOUNTER — NON-APPOINTMENT (OUTPATIENT)
Age: 70
End: 2025-02-19

## 2025-02-19 ENCOUNTER — APPOINTMENT (OUTPATIENT)
Dept: OBGYN | Facility: CLINIC | Age: 70
End: 2025-02-19
Payer: MEDICARE

## 2025-02-19 VITALS
SYSTOLIC BLOOD PRESSURE: 114 MMHG | DIASTOLIC BLOOD PRESSURE: 70 MMHG | TEMPERATURE: 98 F | WEIGHT: 150 LBS | HEIGHT: 64 IN | OXYGEN SATURATION: 99 % | HEART RATE: 78 BPM | BODY MASS INDEX: 25.61 KG/M2

## 2025-02-19 DIAGNOSIS — N89.8 OTHER SPECIFIED NONINFLAMMATORY DISORDERS OF VAGINA: ICD-10-CM

## 2025-02-19 DIAGNOSIS — N94.9 UNSPECIFIED CONDITION ASSOCIATED WITH FEMALE GENITAL ORGANS AND MENSTRUAL CYCLE: ICD-10-CM

## 2025-02-19 LAB
BILIRUB UR QL STRIP: NORMAL
CLARITY UR: CLEAR
COLLECTION METHOD: NORMAL
GLUCOSE UR-MCNC: NORMAL
HCG UR QL: 0.2 EU/DL
HGB UR QL STRIP.AUTO: NORMAL
KETONES UR-MCNC: NORMAL
LEUKOCYTE ESTERASE UR QL STRIP: NORMAL
NITRITE UR QL STRIP: NORMAL
PH UR STRIP: 6
PROT UR STRIP-MCNC: NORMAL
SP GR UR STRIP: 1.01

## 2025-02-19 PROCEDURE — 99213 OFFICE O/P EST LOW 20 MIN: CPT

## 2025-02-19 PROCEDURE — 81003 URINALYSIS AUTO W/O SCOPE: CPT | Mod: QW

## 2025-02-19 RX ORDER — CLOBETASOL PROPIONATE CREAM USP, 0.05% 0.5 MG/G
0.05 CREAM TOPICAL 3 TIMES DAILY
Qty: 42 | Refills: 1 | Status: ACTIVE | COMMUNITY
Start: 2025-02-19 | End: 1900-01-01

## 2025-02-26 NOTE — ED ADULT TRIAGE NOTE - NSWEIGHTCALCTOOLDRUG_GEN_A_CORE
Last appointment: 1/28/25  Next appointment: 2/28/25, 3/31/25  Previous refill encounter(s): 10/21/24 #30 with 5 refills    Requested Prescriptions     Pending Prescriptions Disp Refills    dilTIAZem (CARDIZEM CD) 300 MG extended release capsule [Pharmacy Med Name: DILTIAZEM 24H ER(CD) 300 MG CP] 90 capsule 3     Sig: TAKE 1 CAPSULE BY MOUTH EVERY DAY         For Pharmacy Admin Tracking Only    Program: Medication Refill  CPA in place:    Recommendation Provided To:   Intervention Detail: New Rx: 1, reason: Patient Preference  Intervention Accepted By:   Gap Closed?:    Time Spent (min): 5  
 used

## 2025-04-02 NOTE — ED ADULT NURSE NOTE - NSFALLLASTSIX_ED_ALL_ED
Decrease Metoprolol to 12.5 mg twice a day  Decrease Flecainide to 50 mg twice a day    Obtain 2 week event monitor  Schedule exercise stress test    FU with Dr. Worthington in 6 months  
No.

## 2025-04-18 ENCOUNTER — APPOINTMENT (OUTPATIENT)
Dept: NEUROSURGERY | Facility: CLINIC | Age: 70
End: 2025-04-18

## 2025-04-18 ENCOUNTER — NON-APPOINTMENT (OUTPATIENT)
Age: 70
End: 2025-04-18

## 2025-04-18 DIAGNOSIS — M70.70 OTHER BURSITIS OF HIP, UNSPECIFIED HIP: ICD-10-CM

## 2025-04-18 DIAGNOSIS — M54.16 RADICULOPATHY, LUMBAR REGION: ICD-10-CM

## 2025-04-18 DIAGNOSIS — G57.11 MERALGIA PARESTHETICA, RIGHT LOWER LIMB: ICD-10-CM

## 2025-04-18 PROCEDURE — 99204 OFFICE O/P NEW MOD 45 MIN: CPT

## 2025-04-30 ENCOUNTER — RESULT REVIEW (OUTPATIENT)
Age: 70
End: 2025-04-30

## 2025-04-30 ENCOUNTER — OUTPATIENT (OUTPATIENT)
Dept: OUTPATIENT SERVICES | Facility: HOSPITAL | Age: 70
LOS: 1 days | End: 2025-04-30
Payer: MEDICARE

## 2025-04-30 DIAGNOSIS — Z98.891 HISTORY OF UTERINE SCAR FROM PREVIOUS SURGERY: Chronic | ICD-10-CM

## 2025-04-30 DIAGNOSIS — Z98.82 BREAST IMPLANT STATUS: Chronic | ICD-10-CM

## 2025-04-30 DIAGNOSIS — M54.16 RADICULOPATHY, LUMBAR REGION: ICD-10-CM

## 2025-04-30 PROCEDURE — 72195 MRI PELVIS W/O DYE: CPT | Mod: 26

## 2025-04-30 PROCEDURE — 72110 X-RAY EXAM L-2 SPINE 4/>VWS: CPT

## 2025-04-30 PROCEDURE — 72110 X-RAY EXAM L-2 SPINE 4/>VWS: CPT | Mod: 26

## 2025-04-30 PROCEDURE — 72195 MRI PELVIS W/O DYE: CPT

## 2025-05-01 ENCOUNTER — NON-APPOINTMENT (OUTPATIENT)
Age: 70
End: 2025-05-01

## 2025-05-01 DIAGNOSIS — M54.16 RADICULOPATHY, LUMBAR REGION: ICD-10-CM

## 2025-05-22 ENCOUNTER — APPOINTMENT (OUTPATIENT)
Dept: NEUROSURGERY | Facility: CLINIC | Age: 70
End: 2025-05-22
Payer: MEDICARE

## 2025-05-22 VITALS — WEIGHT: 150 LBS | HEIGHT: 64 IN | BODY MASS INDEX: 25.61 KG/M2

## 2025-05-22 DIAGNOSIS — G57.11 MERALGIA PARESTHETICA, RIGHT LOWER LIMB: ICD-10-CM

## 2025-05-22 DIAGNOSIS — M70.70 OTHER BURSITIS OF HIP, UNSPECIFIED HIP: ICD-10-CM

## 2025-05-22 PROCEDURE — 99213 OFFICE O/P EST LOW 20 MIN: CPT
